# Patient Record
Sex: MALE | Race: WHITE | NOT HISPANIC OR LATINO | URBAN - METROPOLITAN AREA
[De-identification: names, ages, dates, MRNs, and addresses within clinical notes are randomized per-mention and may not be internally consistent; named-entity substitution may affect disease eponyms.]

---

## 2019-10-01 ENCOUNTER — SURGERY - HEALTHEAST (OUTPATIENT)
Dept: SURGERY | Facility: HOSPITAL | Age: 54
End: 2019-10-01

## 2019-10-01 ENCOUNTER — ANESTHESIA - HEALTHEAST (OUTPATIENT)
Dept: SURGERY | Facility: HOSPITAL | Age: 54
End: 2019-10-01

## 2019-10-01 ENCOUNTER — HOSPITAL ENCOUNTER (INPATIENT)
Dept: MEDSURG UNIT | Facility: HOSPITAL | Age: 54
Discharge: HOME | End: 2019-10-11
Attending: EMERGENCY MEDICINE | Admitting: FAMILY MEDICINE

## 2019-10-01 DIAGNOSIS — R06.6 HICCUPS: ICD-10-CM

## 2019-10-01 DIAGNOSIS — K56.699 OTHER SPECIFIED INTESTINAL OBSTRUCTION, UNSPECIFIED WHETHER PARTIAL OR COMPLETE (H): ICD-10-CM

## 2019-10-01 DIAGNOSIS — K65.1 ABSCESS OF MALE PELVIS (H): ICD-10-CM

## 2019-10-01 DIAGNOSIS — K57.92 DIVERTICULITIS: ICD-10-CM

## 2019-10-01 DIAGNOSIS — E83.42 HYPOMAGNESEMIA: ICD-10-CM

## 2019-10-01 DIAGNOSIS — K57.20 DIVERTICULITIS OF LARGE INTESTINE WITH PERFORATION AND ABSCESS WITHOUT BLEEDING: ICD-10-CM

## 2019-10-01 LAB
ALBUMIN SERPL-MCNC: 3.2 G/DL (ref 3.5–5)
ALBUMIN UR-MCNC: ABNORMAL MG/DL
ALP SERPL-CCNC: 71 U/L (ref 45–120)
ALT SERPL W P-5'-P-CCNC: 16 U/L (ref 0–45)
ANION GAP SERPL CALCULATED.3IONS-SCNC: 8 MMOL/L (ref 5–18)
APPEARANCE UR: CLEAR
AST SERPL W P-5'-P-CCNC: 14 U/L (ref 0–40)
BACTERIA #/AREA URNS HPF: ABNORMAL HPF
BILIRUB DIRECT SERPL-MCNC: 0.3 MG/DL
BILIRUB SERPL-MCNC: 0.5 MG/DL (ref 0–1)
BILIRUB UR QL STRIP: ABNORMAL
BUN SERPL-MCNC: 18 MG/DL (ref 8–22)
CALCIUM SERPL-MCNC: 9.1 MG/DL (ref 8.5–10.5)
CHLORIDE BLD-SCNC: 101 MMOL/L (ref 98–107)
CO2 SERPL-SCNC: 27 MMOL/L (ref 22–31)
COLOR UR AUTO: ABNORMAL
CREAT SERPL-MCNC: 0.87 MG/DL (ref 0.7–1.3)
ERYTHROCYTE [DISTWIDTH] IN BLOOD BY AUTOMATED COUNT: 14.5 % (ref 11–14.5)
GFR SERPL CREATININE-BSD FRML MDRD: >60 ML/MIN/1.73M2
GLUCOSE BLD-MCNC: 118 MG/DL (ref 70–125)
GLUCOSE UR STRIP-MCNC: NEGATIVE MG/DL
HCT VFR BLD AUTO: 40.8 % (ref 40–54)
HGB BLD-MCNC: 13.1 G/DL (ref 14–18)
HGB UR QL STRIP: ABNORMAL
KETONES UR STRIP-MCNC: ABNORMAL MG/DL
LEUKOCYTE ESTERASE UR QL STRIP: NEGATIVE
LIPASE SERPL-CCNC: 13 U/L (ref 0–52)
MCH RBC QN AUTO: 28.5 PG (ref 27–34)
MCHC RBC AUTO-ENTMCNC: 32.1 G/DL (ref 32–36)
MCV RBC AUTO: 89 FL (ref 80–100)
MUCOUS THREADS #/AREA URNS LPF: ABNORMAL LPF
NITRATE UR QL: NEGATIVE
PH UR STRIP: 6 [PH] (ref 4.5–8)
PLATELET # BLD AUTO: 532 THOU/UL (ref 140–440)
PMV BLD AUTO: 8.1 FL (ref 8.5–12.5)
POTASSIUM BLD-SCNC: 4.2 MMOL/L (ref 3.5–5)
PROT SERPL-MCNC: 7.2 G/DL (ref 6–8)
RBC # BLD AUTO: 4.6 MILL/UL (ref 4.4–6.2)
RBC #/AREA URNS AUTO: ABNORMAL HPF
SODIUM SERPL-SCNC: 136 MMOL/L (ref 136–145)
SP GR UR STRIP: 1.03 (ref 1–1.03)
SQUAMOUS #/AREA URNS AUTO: ABNORMAL LPF
UROBILINOGEN UR STRIP-ACNC: ABNORMAL
WBC #/AREA URNS AUTO: ABNORMAL HPF
WBC: 16.2 THOU/UL (ref 4–11)

## 2019-10-01 ASSESSMENT — MIFFLIN-ST. JEOR
SCORE: 1818.74
SCORE: 1818.74

## 2019-10-02 LAB
ANION GAP SERPL CALCULATED.3IONS-SCNC: 7 MMOL/L (ref 5–18)
ATRIAL RATE - MUSE: 97 BPM
BASOPHILS # BLD AUTO: 0 THOU/UL (ref 0–0.2)
BASOPHILS NFR BLD AUTO: 0 % (ref 0–2)
BUN SERPL-MCNC: 21 MG/DL (ref 8–22)
CALCIUM SERPL-MCNC: 8.5 MG/DL (ref 8.5–10.5)
CEA SERPL-MCNC: 9.1 NG/ML (ref 0–3)
CHLORIDE BLD-SCNC: 103 MMOL/L (ref 98–107)
CO2 SERPL-SCNC: 26 MMOL/L (ref 22–31)
CREAT SERPL-MCNC: 0.88 MG/DL (ref 0.7–1.3)
DIASTOLIC BLOOD PRESSURE - MUSE: NORMAL
EOSINOPHIL # BLD AUTO: 0 THOU/UL (ref 0–0.4)
EOSINOPHIL NFR BLD AUTO: 0 % (ref 0–6)
ERYTHROCYTE [DISTWIDTH] IN BLOOD BY AUTOMATED COUNT: 14.5 % (ref 11–14.5)
GFR SERPL CREATININE-BSD FRML MDRD: >60 ML/MIN/1.73M2
GLUCOSE BLD-MCNC: 149 MG/DL (ref 70–125)
HCT VFR BLD AUTO: 42.6 % (ref 40–54)
HGB BLD-MCNC: 13.7 G/DL (ref 14–18)
INTERPRETATION ECG - MUSE: NORMAL
LYMPHOCYTES # BLD AUTO: 0.8 THOU/UL (ref 0.8–4.4)
LYMPHOCYTES NFR BLD AUTO: 4 % (ref 20–40)
MCH RBC QN AUTO: 28.4 PG (ref 27–34)
MCHC RBC AUTO-ENTMCNC: 32.2 G/DL (ref 32–36)
MCV RBC AUTO: 88 FL (ref 80–100)
MONOCYTES # BLD AUTO: 0.8 THOU/UL (ref 0–0.9)
MONOCYTES NFR BLD AUTO: 4 % (ref 2–10)
NEUTROPHILS # BLD AUTO: 16.2 THOU/UL (ref 2–7.7)
NEUTROPHILS NFR BLD AUTO: 91 % (ref 50–70)
P AXIS - MUSE: 62 DEGREES
PLATELET # BLD AUTO: 579 THOU/UL (ref 140–440)
PMV BLD AUTO: 8 FL (ref 8.5–12.5)
POTASSIUM BLD-SCNC: 4.6 MMOL/L (ref 3.5–5)
PR INTERVAL - MUSE: 136 MS
QRS DURATION - MUSE: 82 MS
QT - MUSE: 376 MS
QTC - MUSE: 477 MS
R AXIS - MUSE: 71 DEGREES
RBC # BLD AUTO: 4.82 MILL/UL (ref 4.4–6.2)
SODIUM SERPL-SCNC: 136 MMOL/L (ref 136–145)
SYSTOLIC BLOOD PRESSURE - MUSE: NORMAL
T AXIS - MUSE: 61 DEGREES
VENTRICULAR RATE- MUSE: 97 BPM
WBC: 17.8 THOU/UL (ref 4–11)

## 2019-10-02 ASSESSMENT — MIFFLIN-ST. JEOR
SCORE: 1829.17
SCORE: 1829.17

## 2019-10-03 LAB
LAB AP CHARGES (HE HISTORICAL CONVERSION): NORMAL
PATH REPORT.COMMENTS IMP SPEC: NORMAL
PATH REPORT.FINAL DX SPEC: NORMAL
PATH REPORT.GROSS SPEC: NORMAL
PATH REPORT.MICROSCOPIC SPEC OTHER STN: NORMAL
PATH REPORT.RELEVANT HX SPEC: NORMAL
RESULT FLAG (HE HISTORICAL CONVERSION): NORMAL

## 2019-10-03 ASSESSMENT — MIFFLIN-ST. JEOR
SCORE: 1834.62
SCORE: 1834.62

## 2019-10-04 LAB
ANION GAP SERPL CALCULATED.3IONS-SCNC: 9 MMOL/L (ref 5–18)
BUN SERPL-MCNC: 27 MG/DL (ref 8–22)
CALCIUM SERPL-MCNC: 7.9 MG/DL (ref 8.5–10.5)
CHLORIDE BLD-SCNC: 102 MMOL/L (ref 98–107)
CO2 SERPL-SCNC: 28 MMOL/L (ref 22–31)
CREAT SERPL-MCNC: 0.94 MG/DL (ref 0.7–1.3)
ERYTHROCYTE [DISTWIDTH] IN BLOOD BY AUTOMATED COUNT: 14.6 % (ref 11–14.5)
GFR SERPL CREATININE-BSD FRML MDRD: >60 ML/MIN/1.73M2
GLUCOSE BLD-MCNC: 132 MG/DL (ref 70–125)
HCT VFR BLD AUTO: 36.6 % (ref 40–54)
HGB BLD-MCNC: 11.8 G/DL (ref 14–18)
MCH RBC QN AUTO: 28.4 PG (ref 27–34)
MCHC RBC AUTO-ENTMCNC: 32.2 G/DL (ref 32–36)
MCV RBC AUTO: 88 FL (ref 80–100)
PLATELET # BLD AUTO: 559 THOU/UL (ref 140–440)
PLATELET # BLD AUTO: 559 THOU/UL (ref 140–440)
PMV BLD AUTO: 8.2 FL (ref 8.5–12.5)
POTASSIUM BLD-SCNC: 4.8 MMOL/L (ref 3.5–5)
RBC # BLD AUTO: 4.16 MILL/UL (ref 4.4–6.2)
SODIUM SERPL-SCNC: 139 MMOL/L (ref 136–145)
WBC: 6 THOU/UL (ref 4–11)

## 2019-10-04 ASSESSMENT — MIFFLIN-ST. JEOR
SCORE: 1820.56
SCORE: 1820.56

## 2019-10-05 LAB
ANION GAP SERPL CALCULATED.3IONS-SCNC: 6 MMOL/L (ref 5–18)
BUN SERPL-MCNC: 26 MG/DL (ref 8–22)
CALCIUM SERPL-MCNC: 7.9 MG/DL (ref 8.5–10.5)
CHLORIDE BLD-SCNC: 106 MMOL/L (ref 98–107)
CO2 SERPL-SCNC: 29 MMOL/L (ref 22–31)
CREAT SERPL-MCNC: 0.93 MG/DL (ref 0.7–1.3)
ERYTHROCYTE [DISTWIDTH] IN BLOOD BY AUTOMATED COUNT: 14.9 % (ref 11–14.5)
GFR SERPL CREATININE-BSD FRML MDRD: >60 ML/MIN/1.73M2
GLUCOSE BLD-MCNC: 120 MG/DL (ref 70–125)
HCT VFR BLD AUTO: 34.2 % (ref 40–54)
HGB BLD-MCNC: 10.8 G/DL (ref 14–18)
MCH RBC QN AUTO: 28.1 PG (ref 27–34)
MCHC RBC AUTO-ENTMCNC: 31.6 G/DL (ref 32–36)
MCV RBC AUTO: 89 FL (ref 80–100)
PLATELET # BLD AUTO: 514 THOU/UL (ref 140–440)
PMV BLD AUTO: 8 FL (ref 8.5–12.5)
POTASSIUM BLD-SCNC: 4.5 MMOL/L (ref 3.5–5)
RBC # BLD AUTO: 3.85 MILL/UL (ref 4.4–6.2)
SODIUM SERPL-SCNC: 141 MMOL/L (ref 136–145)
WBC: 7.9 THOU/UL (ref 4–11)

## 2019-10-05 ASSESSMENT — MIFFLIN-ST. JEOR
SCORE: 1820.56
SCORE: 1820.56

## 2019-10-06 LAB
ANION GAP SERPL CALCULATED.3IONS-SCNC: 10 MMOL/L (ref 5–18)
BACTERIA SPEC CULT: ABNORMAL
BUN SERPL-MCNC: 24 MG/DL (ref 8–22)
CALCIUM SERPL-MCNC: 8.4 MG/DL (ref 8.5–10.5)
CHLORIDE BLD-SCNC: 104 MMOL/L (ref 98–107)
CO2 SERPL-SCNC: 25 MMOL/L (ref 22–31)
CREAT SERPL-MCNC: 0.78 MG/DL (ref 0.7–1.3)
GFR SERPL CREATININE-BSD FRML MDRD: >60 ML/MIN/1.73M2
GLUCOSE BLD-MCNC: 114 MG/DL (ref 70–125)
GRAM STAIN RESULT: ABNORMAL
PLATELET # BLD AUTO: 582 THOU/UL (ref 140–440)
POTASSIUM BLD-SCNC: 4.4 MMOL/L (ref 3.5–5)
SODIUM SERPL-SCNC: 139 MMOL/L (ref 136–145)

## 2019-10-06 ASSESSMENT — MIFFLIN-ST. JEOR
SCORE: 1809.22
SCORE: 1809.22

## 2019-10-07 LAB
ANION GAP SERPL CALCULATED.3IONS-SCNC: 10 MMOL/L (ref 5–18)
BASOPHILS # BLD AUTO: 0 THOU/UL (ref 0–0.2)
BASOPHILS NFR BLD AUTO: 0 % (ref 0–2)
BUN SERPL-MCNC: 26 MG/DL (ref 8–22)
C DIFF TOX B STL QL: NEGATIVE
CALCIUM SERPL-MCNC: 8.3 MG/DL (ref 8.5–10.5)
CHLORIDE BLD-SCNC: 104 MMOL/L (ref 98–107)
CO2 SERPL-SCNC: 24 MMOL/L (ref 22–31)
CREAT SERPL-MCNC: 0.81 MG/DL (ref 0.7–1.3)
EOSINOPHIL # BLD AUTO: 0.1 THOU/UL (ref 0–0.4)
EOSINOPHIL NFR BLD AUTO: 1 % (ref 0–6)
ERYTHROCYTE [DISTWIDTH] IN BLOOD BY AUTOMATED COUNT: 14.6 % (ref 11–14.5)
ERYTHROCYTE [DISTWIDTH] IN BLOOD BY AUTOMATED COUNT: 14.7 % (ref 11–14.5)
GFR SERPL CREATININE-BSD FRML MDRD: >60 ML/MIN/1.73M2
GLUCOSE BLD-MCNC: 112 MG/DL (ref 70–125)
HCT VFR BLD AUTO: 37.2 % (ref 40–54)
HCT VFR BLD AUTO: 37.9 % (ref 40–54)
HGB BLD-MCNC: 11.8 G/DL (ref 14–18)
HGB BLD-MCNC: 12.1 G/DL (ref 14–18)
LACTATE SERPL-SCNC: 1.1 MMOL/L (ref 0.5–2.2)
LYMPHOCYTES # BLD AUTO: 0.8 THOU/UL (ref 0.8–4.4)
LYMPHOCYTES NFR BLD AUTO: 5 % (ref 20–40)
MCH RBC QN AUTO: 27.8 PG (ref 27–34)
MCH RBC QN AUTO: 28 PG (ref 27–34)
MCHC RBC AUTO-ENTMCNC: 31.7 G/DL (ref 32–36)
MCHC RBC AUTO-ENTMCNC: 31.9 G/DL (ref 32–36)
MCV RBC AUTO: 88 FL (ref 80–100)
MCV RBC AUTO: 88 FL (ref 80–100)
MONOCYTES # BLD AUTO: 0.6 THOU/UL (ref 0–0.9)
MONOCYTES NFR BLD AUTO: 4 % (ref 2–10)
NEUTROPHILS # BLD AUTO: 13.8 THOU/UL (ref 2–7.7)
NEUTROPHILS NFR BLD AUTO: 89 % (ref 50–70)
PLATELET # BLD AUTO: 594 THOU/UL (ref 140–440)
PLATELET # BLD AUTO: 616 THOU/UL (ref 140–440)
PMV BLD AUTO: 8 FL (ref 8.5–12.5)
PMV BLD AUTO: 8.3 FL (ref 8.5–12.5)
POTASSIUM BLD-SCNC: 4.2 MMOL/L (ref 3.5–5)
RBC # BLD AUTO: 4.25 MILL/UL (ref 4.4–6.2)
RBC # BLD AUTO: 4.32 MILL/UL (ref 4.4–6.2)
RIBOTYPE 027/NAP1/BI: NORMAL
SODIUM SERPL-SCNC: 138 MMOL/L (ref 136–145)
WBC: 11.7 THOU/UL (ref 4–11)
WBC: 15.5 THOU/UL (ref 4–11)

## 2019-10-08 LAB
ALBUMIN SERPL-MCNC: 2.4 G/DL (ref 3.5–5)
ALP SERPL-CCNC: 51 U/L (ref 45–120)
ALT SERPL W P-5'-P-CCNC: 13 U/L (ref 0–45)
ANION GAP SERPL CALCULATED.3IONS-SCNC: 10 MMOL/L (ref 5–18)
AST SERPL W P-5'-P-CCNC: 12 U/L (ref 0–40)
BILIRUB SERPL-MCNC: 0.6 MG/DL (ref 0–1)
BUN SERPL-MCNC: 21 MG/DL (ref 8–22)
CALCIUM SERPL-MCNC: 7.8 MG/DL (ref 8.5–10.5)
CHLORIDE BLD-SCNC: 104 MMOL/L (ref 98–107)
CO2 SERPL-SCNC: 24 MMOL/L (ref 22–31)
CREAT SERPL-MCNC: 0.71 MG/DL (ref 0.7–1.3)
ERYTHROCYTE [DISTWIDTH] IN BLOOD BY AUTOMATED COUNT: 14.8 % (ref 11–14.5)
GFR SERPL CREATININE-BSD FRML MDRD: >60 ML/MIN/1.73M2
GLUCOSE BLD-MCNC: 112 MG/DL (ref 70–125)
HCT VFR BLD AUTO: 34.6 % (ref 40–54)
HGB BLD-MCNC: 11 G/DL (ref 14–18)
INR PPP: 1.36 (ref 0.9–1.1)
MAGNESIUM SERPL-MCNC: 1.9 MG/DL (ref 1.8–2.6)
MCH RBC QN AUTO: 28.1 PG (ref 27–34)
MCHC RBC AUTO-ENTMCNC: 31.8 G/DL (ref 32–36)
MCV RBC AUTO: 89 FL (ref 80–100)
PLATELET # BLD AUTO: 577 THOU/UL (ref 140–440)
PMV BLD AUTO: 8.3 FL (ref 8.5–12.5)
POTASSIUM BLD-SCNC: 3.9 MMOL/L (ref 3.5–5)
PROT SERPL-MCNC: 5.8 G/DL (ref 6–8)
RBC # BLD AUTO: 3.91 MILL/UL (ref 4.4–6.2)
SODIUM SERPL-SCNC: 138 MMOL/L (ref 136–145)
WBC: 12.8 THOU/UL (ref 4–11)

## 2019-10-09 LAB
ANION GAP SERPL CALCULATED.3IONS-SCNC: 8 MMOL/L (ref 5–18)
BUN SERPL-MCNC: 15 MG/DL (ref 8–22)
CALCIUM SERPL-MCNC: 7.5 MG/DL (ref 8.5–10.5)
CHLORIDE BLD-SCNC: 106 MMOL/L (ref 98–107)
CO2 SERPL-SCNC: 25 MMOL/L (ref 22–31)
CREAT SERPL-MCNC: 0.62 MG/DL (ref 0.7–1.3)
ERYTHROCYTE [DISTWIDTH] IN BLOOD BY AUTOMATED COUNT: 15 % (ref 11–14.5)
GFR SERPL CREATININE-BSD FRML MDRD: >60 ML/MIN/1.73M2
GLUCOSE BLD-MCNC: 126 MG/DL (ref 70–125)
HCT VFR BLD AUTO: 31.1 % (ref 40–54)
HGB BLD-MCNC: 9.9 G/DL (ref 14–18)
LACTATE SERPL-SCNC: 0.8 MMOL/L (ref 0.5–2.2)
MAGNESIUM SERPL-MCNC: 2 MG/DL (ref 1.8–2.6)
MCH RBC QN AUTO: 28 PG (ref 27–34)
MCHC RBC AUTO-ENTMCNC: 31.8 G/DL (ref 32–36)
MCV RBC AUTO: 88 FL (ref 80–100)
PHOSPHATE SERPL-MCNC: 1.6 MG/DL (ref 2.5–4.5)
PLATELET # BLD AUTO: 501 THOU/UL (ref 140–440)
PMV BLD AUTO: 8.2 FL (ref 8.5–12.5)
POTASSIUM BLD-SCNC: 3.6 MMOL/L (ref 3.5–5)
POTASSIUM BLD-SCNC: 3.9 MMOL/L (ref 3.5–5)
RBC # BLD AUTO: 3.54 MILL/UL (ref 4.4–6.2)
SODIUM SERPL-SCNC: 139 MMOL/L (ref 136–145)
WBC: 13.8 THOU/UL (ref 4–11)

## 2019-10-09 ASSESSMENT — MIFFLIN-ST. JEOR
SCORE: 1811.94
SCORE: 1811.94

## 2019-10-10 LAB
ERYTHROCYTE [DISTWIDTH] IN BLOOD BY AUTOMATED COUNT: 15.2 % (ref 11–14.5)
HCT VFR BLD AUTO: 34.2 % (ref 40–54)
HGB BLD-MCNC: 10.7 G/DL (ref 14–18)
MAGNESIUM SERPL-MCNC: 1.7 MG/DL (ref 1.8–2.6)
MCH RBC QN AUTO: 27.6 PG (ref 27–34)
MCHC RBC AUTO-ENTMCNC: 31.3 G/DL (ref 32–36)
MCV RBC AUTO: 88 FL (ref 80–100)
PHOSPHATE SERPL-MCNC: 2 MG/DL (ref 2.5–4.5)
PLATELET # BLD AUTO: 568 THOU/UL (ref 140–440)
PMV BLD AUTO: 8.5 FL (ref 8.5–12.5)
POTASSIUM BLD-SCNC: 3.4 MMOL/L (ref 3.5–5)
POTASSIUM BLD-SCNC: 3.5 MMOL/L (ref 3.5–5)
RBC # BLD AUTO: 3.87 MILL/UL (ref 4.4–6.2)
WBC: 14.9 THOU/UL (ref 4–11)

## 2019-10-11 LAB
ALBUMIN SERPL-MCNC: 2.1 G/DL (ref 3.5–5)
ALP SERPL-CCNC: 51 U/L (ref 45–120)
ALT SERPL W P-5'-P-CCNC: 12 U/L (ref 0–45)
ANION GAP SERPL CALCULATED.3IONS-SCNC: 8 MMOL/L (ref 5–18)
AST SERPL W P-5'-P-CCNC: 15 U/L (ref 0–40)
ATRIAL RATE - MUSE: 97 BPM
BILIRUB SERPL-MCNC: 0.5 MG/DL (ref 0–1)
BUN SERPL-MCNC: 6 MG/DL (ref 8–22)
CALCIUM SERPL-MCNC: 7.6 MG/DL (ref 8.5–10.5)
CHLORIDE BLD-SCNC: 101 MMOL/L (ref 98–107)
CO2 SERPL-SCNC: 27 MMOL/L (ref 22–31)
CREAT SERPL-MCNC: 0.7 MG/DL (ref 0.7–1.3)
DIASTOLIC BLOOD PRESSURE - MUSE: NORMAL
ERYTHROCYTE [DISTWIDTH] IN BLOOD BY AUTOMATED COUNT: 15.3 % (ref 11–14.5)
GFR SERPL CREATININE-BSD FRML MDRD: >60 ML/MIN/1.73M2
GLUCOSE BLD-MCNC: 106 MG/DL (ref 70–125)
HCT VFR BLD AUTO: 30.6 % (ref 40–54)
HGB BLD-MCNC: 9.7 G/DL (ref 14–18)
INTERPRETATION ECG - MUSE: NORMAL
MCH RBC QN AUTO: 27.8 PG (ref 27–34)
MCHC RBC AUTO-ENTMCNC: 31.7 G/DL (ref 32–36)
MCV RBC AUTO: 88 FL (ref 80–100)
P AXIS - MUSE: 29 DEGREES
PHOSPHATE SERPL-MCNC: 2.8 MG/DL (ref 2.5–4.5)
PLATELET # BLD AUTO: 554 THOU/UL (ref 140–440)
PMV BLD AUTO: 8.5 FL (ref 8.5–12.5)
POTASSIUM BLD-SCNC: 3.7 MMOL/L (ref 3.5–5)
PR INTERVAL - MUSE: 136 MS
PROT SERPL-MCNC: 5.3 G/DL (ref 6–8)
QRS DURATION - MUSE: 90 MS
QT - MUSE: 370 MS
QTC - MUSE: 469 MS
R AXIS - MUSE: 62 DEGREES
RBC # BLD AUTO: 3.49 MILL/UL (ref 4.4–6.2)
SODIUM SERPL-SCNC: 136 MMOL/L (ref 136–145)
SYSTOLIC BLOOD PRESSURE - MUSE: NORMAL
T AXIS - MUSE: 59 DEGREES
VENTRICULAR RATE- MUSE: 97 BPM
WBC: 14.7 THOU/UL (ref 4–11)

## 2019-10-11 RX ORDER — CALCIUM CARBONATE 500 MG/1
200 TABLET, CHEWABLE ORAL 4 TIMES DAILY PRN
Refills: 0 | Status: SHIPPED | COMMUNITY
Start: 2019-10-11

## 2019-10-12 LAB
AEROBIC BLOOD CULTURE BOTTLE: NO GROWTH
AEROBIC BLOOD CULTURE BOTTLE: NO GROWTH
ANAEROBIC BLOOD CULTURE BOTTLE: NO GROWTH
ANAEROBIC BLOOD CULTURE BOTTLE: NO GROWTH

## 2019-10-13 LAB
BACTERIA SPEC CULT: ABNORMAL
GRAM STAIN RESULT: ABNORMAL

## 2019-10-14 ENCOUNTER — COMMUNICATION - HEALTHEAST (OUTPATIENT)
Dept: SURGERY | Facility: CLINIC | Age: 54
End: 2019-10-14

## 2019-10-14 ENCOUNTER — RECORDS - HEALTHEAST (OUTPATIENT)
Dept: LAB | Facility: CLINIC | Age: 54
End: 2019-10-14

## 2019-10-15 LAB
ANION GAP SERPL CALCULATED.3IONS-SCNC: 6 MMOL/L (ref 5–18)
BUN SERPL-MCNC: 10 MG/DL (ref 8–22)
CALCIUM SERPL-MCNC: 8.6 MG/DL (ref 8.5–10.5)
CHLORIDE BLD-SCNC: 103 MMOL/L (ref 98–107)
CHOLEST SERPL-MCNC: 99 MG/DL
CO2 SERPL-SCNC: 26 MMOL/L (ref 22–31)
CREAT SERPL-MCNC: 0.78 MG/DL (ref 0.7–1.3)
FASTING STATUS PATIENT QL REPORTED: ABNORMAL
GFR SERPL CREATININE-BSD FRML MDRD: >60 ML/MIN/1.73M2
GLUCOSE BLD-MCNC: 101 MG/DL (ref 70–125)
HDLC SERPL-MCNC: 19 MG/DL
LDLC SERPL CALC-MCNC: 61 MG/DL
MAGNESIUM SERPL-MCNC: 2 MG/DL (ref 1.8–2.6)
POTASSIUM BLD-SCNC: 5.4 MMOL/L (ref 3.5–5)
SODIUM SERPL-SCNC: 135 MMOL/L (ref 136–145)
TRIGL SERPL-MCNC: 95 MG/DL

## 2019-10-17 ENCOUNTER — AMBULATORY - HEALTHEAST (OUTPATIENT)
Dept: SCHEDULING | Facility: CLINIC | Age: 54
End: 2019-10-17

## 2019-10-17 DIAGNOSIS — Z71.89 OSTOMY NURSE CONSULTATION: ICD-10-CM

## 2019-10-22 ENCOUNTER — OFFICE VISIT - HEALTHEAST (OUTPATIENT)
Dept: SURGERY | Facility: CLINIC | Age: 54
End: 2019-10-22

## 2019-10-22 ENCOUNTER — OFFICE VISIT - HEALTHEAST (OUTPATIENT)
Dept: WOUND CARE | Facility: HOSPITAL | Age: 54
End: 2019-10-22

## 2019-10-22 DIAGNOSIS — K65.1 PELVIC ABSCESS IN MALE (H): ICD-10-CM

## 2019-10-22 DIAGNOSIS — Z71.89 OSTOMY NURSE CONSULTATION: ICD-10-CM

## 2019-10-22 DIAGNOSIS — Z48.89 POSTOPERATIVE VISIT: ICD-10-CM

## 2019-10-22 LAB
ERYTHROCYTE [DISTWIDTH] IN BLOOD BY AUTOMATED COUNT: 13.4 % (ref 11–14.5)
HCT VFR BLD AUTO: 38.4 % (ref 40–54)
HGB BLD-MCNC: 12.6 G/DL (ref 14–18)
MCH RBC QN AUTO: 28.3 PG (ref 27–34)
MCHC RBC AUTO-ENTMCNC: 32.9 G/DL (ref 32–36)
MCV RBC AUTO: 86 FL (ref 80–100)
PLATELET # BLD AUTO: 581 THOU/UL (ref 140–440)
PMV BLD AUTO: 6.1 FL (ref 7–10)
RBC # BLD AUTO: 4.46 MILL/UL (ref 4.4–6.2)
WBC: 7.5 THOU/UL (ref 4–11)

## 2019-10-22 ASSESSMENT — MIFFLIN-ST. JEOR: SCORE: 1826.45

## 2019-10-24 ENCOUNTER — AMBULATORY - HEALTHEAST (OUTPATIENT)
Dept: SURGERY | Facility: CLINIC | Age: 54
End: 2019-10-24

## 2019-10-24 DIAGNOSIS — Z93.9 HISTORY OF CREATION OF OSTOMY (H): ICD-10-CM

## 2019-11-06 ENCOUNTER — COMMUNICATION - HEALTHEAST (OUTPATIENT)
Dept: SURGERY | Facility: CLINIC | Age: 54
End: 2019-11-06

## 2019-12-02 ENCOUNTER — RECORDS - HEALTHEAST (OUTPATIENT)
Dept: LAB | Facility: CLINIC | Age: 54
End: 2019-12-02

## 2019-12-02 LAB
ANION GAP SERPL CALCULATED.3IONS-SCNC: 9 MMOL/L (ref 5–18)
BUN SERPL-MCNC: 15 MG/DL (ref 8–22)
CALCIUM SERPL-MCNC: 9.1 MG/DL (ref 8.5–10.5)
CHLORIDE BLD-SCNC: 105 MMOL/L (ref 98–107)
CO2 SERPL-SCNC: 25 MMOL/L (ref 22–31)
CREAT SERPL-MCNC: 0.89 MG/DL (ref 0.7–1.3)
GFR SERPL CREATININE-BSD FRML MDRD: >60 ML/MIN/1.73M2
GLUCOSE BLD-MCNC: 102 MG/DL (ref 70–125)
POTASSIUM BLD-SCNC: 4.4 MMOL/L (ref 3.5–5)
SODIUM SERPL-SCNC: 139 MMOL/L (ref 136–145)

## 2020-01-07 ENCOUNTER — OFFICE VISIT - HEALTHEAST (OUTPATIENT)
Dept: SURGERY | Facility: CLINIC | Age: 55
End: 2020-01-07

## 2020-01-07 DIAGNOSIS — Z93.9 HISTORY OF CREATION OF OSTOMY (H): ICD-10-CM

## 2020-01-07 ASSESSMENT — MIFFLIN-ST. JEOR: SCORE: 1820.56

## 2020-01-13 ENCOUNTER — SURGERY - HEALTHEAST (OUTPATIENT)
Dept: SURGERY | Facility: CLINIC | Age: 55
End: 2020-01-13

## 2020-01-13 DIAGNOSIS — Z12.11 ENCOUNTER FOR SCREENING COLONOSCOPY: ICD-10-CM

## 2020-01-20 ENCOUNTER — RECORDS - HEALTHEAST (OUTPATIENT)
Dept: LAB | Facility: CLINIC | Age: 55
End: 2020-01-20

## 2020-01-20 LAB
ANION GAP SERPL CALCULATED.3IONS-SCNC: 13 MMOL/L (ref 5–18)
BUN SERPL-MCNC: 16 MG/DL (ref 8–22)
CALCIUM SERPL-MCNC: 8.8 MG/DL (ref 8.5–10.5)
CHLORIDE BLD-SCNC: 106 MMOL/L (ref 98–107)
CO2 SERPL-SCNC: 22 MMOL/L (ref 22–31)
CREAT SERPL-MCNC: 0.85 MG/DL (ref 0.7–1.3)
GFR SERPL CREATININE-BSD FRML MDRD: >60 ML/MIN/1.73M2
GLUCOSE BLD-MCNC: 70 MG/DL (ref 70–125)
POTASSIUM BLD-SCNC: 4.4 MMOL/L (ref 3.5–5)
SODIUM SERPL-SCNC: 141 MMOL/L (ref 136–145)

## 2020-01-22 ASSESSMENT — MIFFLIN-ST. JEOR: SCORE: 1820.56

## 2020-01-23 ENCOUNTER — ANESTHESIA - HEALTHEAST (OUTPATIENT)
Dept: SURGERY | Facility: AMBULATORY SURGERY CENTER | Age: 55
End: 2020-01-23

## 2020-01-24 ENCOUNTER — ANESTHESIA - HEALTHEAST (OUTPATIENT)
Dept: SURGERY | Facility: HOSPITAL | Age: 55
End: 2020-01-24

## 2020-01-24 ENCOUNTER — SURGERY - HEALTHEAST (OUTPATIENT)
Dept: SURGERY | Facility: AMBULATORY SURGERY CENTER | Age: 55
End: 2020-01-24

## 2020-01-27 ENCOUNTER — SURGERY - HEALTHEAST (OUTPATIENT)
Dept: SURGERY | Facility: HOSPITAL | Age: 55
End: 2020-01-27

## 2020-01-30 ASSESSMENT — MIFFLIN-ST. JEOR
SCORE: 1835.07
SCORE: 1824.64

## 2020-02-03 ENCOUNTER — COMMUNICATION - HEALTHEAST (OUTPATIENT)
Dept: SURGERY | Facility: CLINIC | Age: 55
End: 2020-02-03

## 2020-02-11 ENCOUNTER — OFFICE VISIT - HEALTHEAST (OUTPATIENT)
Dept: SURGERY | Facility: CLINIC | Age: 55
End: 2020-02-11

## 2020-02-11 DIAGNOSIS — Z48.89 POSTOPERATIVE VISIT: ICD-10-CM

## 2020-02-11 RX ORDER — PRIMIDONE 50 MG/1
TABLET ORAL
Status: SHIPPED | COMMUNITY
Start: 2020-02-10

## 2020-03-10 ENCOUNTER — COMMUNICATION - HEALTHEAST (OUTPATIENT)
Dept: SURGERY | Facility: CLINIC | Age: 55
End: 2020-03-10

## 2021-05-27 ENCOUNTER — RECORDS - HEALTHEAST (OUTPATIENT)
Dept: ADMINISTRATIVE | Facility: CLINIC | Age: 56
End: 2021-05-27

## 2021-06-01 NOTE — CONSULTS
Consults     Consultation - Surgery  Kwaku Le,  1965, MRN [id number]    Admitting Dx: No admission diagnoses are documented for this encounter.    PCP: Provider, No Primary Care, None   Code status:  No Order       Extended Emergency Contact Information  Primary Emergency Contact: KHALIF MONTE  Mobile Phone: [phone]  Relation: Significant Other  Secondary Emergency Contact: FUENTES LE  Mobile Phone: [phone]  Relation: Sibling       Assessment and Plan   Impression:  Colonic obstruction likely secondary to the colon cancer with secondary acute perforated diverticulitis.  Patient I think needs surgical intervention tonight.  I discussed this with him.  Because his proximal colon is so dilated, and there is likely going to be significant inflammation, this will require a sigmoid colectomy with diverting colostomy.  He understands.  He asked appropriate questions.      Plan:  To the OR for exploratory laparotomy, sigmoid colectomy with diverting colostomy.  Check a CEA.           Chief Complaint Diverticulitis       HPI    We have been requested by Dr. Staples to evaluate Kwaku Le for abdominal pain.  This is a 53 y.o. year old male progressive abdominal pain over the weekend.  Patient states he has had increasing constipation over the last month but then over the weekend began feeling quite a bit more bloated and complaining of pain in the left lower quadrant.  He denies any fevers.  He denies any chills.  He denies any blood in his stool.  He is never had a colonoscopy.  He has no family history of colon cancer.       Medical History  There are no active non-hospital problems to display for this patient.    No past medical history on file. Surgical History  He  has no past surgical history on file.   Social History  1 to 2 pack a day smoker, occasional alcohol on weekends.   Allergies  No Known Allergies Family History  Reviewed, and family history is not on file.  The Family history  is not pertinent to the patients chief complaint.  No family history of colon cancer Psychosocial Needs  Social History     Patient does not qualify to have social determinant information on file (likely too young).   Social History Narrative     Not on file     Additional psychosocial needs reviewed per nursing assessment.     Prior to Admission Medications   Medications Prior to Admission   Medication Sig Dispense Refill Last Dose     ibuprofen (ADVIL,MOTRIN) 200 MG tablet Take 600 mg by mouth every 6 (six) hours as needed for pain.   9/27/2019          Review of Systems:  A 12 point comprehensive review of systems was negative except as noted. Physical Exam:  Temp:  [98.4  F (36.9  C)] 98.4  F (36.9  C)  Heart Rate:  [] 95  Resp:  [20] 20  BP: (120-137)/(70-75) 128/70    General appearance: alert, appears stated age and cooperative  Eyes: negative   Lungs: Breathing comfortably.  No shortness of breath.  Abdomen: Markedly distended and tympanitic.  Fairly minimal tenderness in the left lower quadrant.  No guarding or rebound.  No palpable mass.  Extremities: extremities normal, atraumatic, no cyanosis or edema  Skin: Skin color, texture, turgor normal. No rashes or lesions  Neurologic: Grossly normal       Pertinent Labs  Lab Results: personally reviewed.   Lab Results   Component Value Date     10/01/2019    K 4.2 10/01/2019     10/01/2019    CO2 27 10/01/2019    BUN 18 10/01/2019    CREATININE 0.87 10/01/2019    CALCIUM 9.1 10/01/2019     Lab Results   Component Value Date    WBC 16.2 (H) 10/01/2019    HGB 13.1 (L) 10/01/2019    HCT 40.8 10/01/2019    MCV 89 10/01/2019     (H) 10/01/2019        Pertinent Radiology  Radiology Results: Personally reviewed image/s and Personally reviewed impression/s  EKG Results: not reviewed.

## 2021-06-01 NOTE — PROGRESS NOTES
Care Management ASIF Assessment Note:    Met with patient to review role of Care Management, progression of care and possible needs at discharge, including OP services, home care,or skilled nursing care.      [facility] [place]. Discharge needs to be determined and unknown at time of assessment due to surgery and diagnosis.      Terri Nam Care Manager ASIF   [facility].

## 2021-06-01 NOTE — PLAN OF CARE
Problem: Pain  Goal: Patient's pain/discomfort is manageable  10/2/2019 1545 by Adrianne Lopez, RN  Outcome: Progressing  10/2/2019 1047 by Adrianne Lopez, RN  Outcome: Progressing     Problem: Psychosocial Needs  Goal: Collaborate with patient/family/caregiver to identify patient specific goals for this hospitalization  10/2/2019 1545 by Adrianne Lopez, RN  Outcome: Progressing  10/2/2019 1047 by Adrianne Lopez, RN  Outcome: Progressing     VSS. Pain managed with scheduled Tylenol and PRN Toradol. PRN Toradol given x 1. Hypoactive bowel sounds. Dressing changed to midline incision. CDI. GEORGINA drain in place to right lower abdomen- 30cc out this shift. FC in place- cloudy/melinda in color- only 100cc out. 500cc bolus given this a.m. and order for a 1L bolus to be given this afternoon- running currently. Continue to monitor. Continues on IV Zosyn. Pt is NPO with ice chips- and sips of water ok with medications. Colostomy- very small amount of stool noted in bag, and small amount of air noted as well after going for a walk this afternoon. Pt took two walks this shift and tolerated well. Up with SBA. NGT removed this a.m. around 1030.

## 2021-06-01 NOTE — PLAN OF CARE
"  Problem: Pain  Goal: Patient's pain/discomfort is manageable  Outcome: Progressing     Patient able to make needs known. SBA. PRN pain medications with relief. Sleeping on and off during the night. Passing some gas into colostomy bag, and a small amount of watery stool. Urine output starting to pickup in vega, 350 ml out overnight. Went for walk this AM, tolerated well.     Vital signs stable. /67 (Patient Position: Lying)   Pulse 95   Temp 97.8  F (36.6  C) (Oral)   Resp 16   Ht 5' 10\" (1.778 m)   Wt 218 lb 14.4 oz (99.3 kg)   SpO2 94%   BMI 31.41 kg/m    "

## 2021-06-01 NOTE — ANESTHESIA POSTPROCEDURE EVALUATION
Patient: Kwaku Todd  COLECTOMY, SIGMOID, OPEN  Anesthesia type: general    Patient location: PACU  Last vitals:   Vitals Value Taken Time   /89 10/2/2019  7:17 AM   Temp 36.4  C (97.6  F) 10/2/2019  7:17 AM   Pulse 98 10/2/2019  7:17 AM   Resp 20 10/2/2019  7:17 AM   SpO2 93 % 10/2/2019  7:17 AM     Post vital signs: stable  Level of consciousness: awake and responds to simple questions  Post-anesthesia pain: pain controlled  Post-anesthesia nausea and vomiting: no  Pulmonary: unassisted, return to baseline  Cardiovascular: stable and blood pressure at baseline  Hydration: adequate  Anesthetic events: no    QCDR Measures:  ASA# 11 - Danielel-op Cardiac Arrest: ASA11B - Patient did NOT experience unanticipated cardiac arrest  ASA# 12 - Danielle-op Mortality Rate: ASA12B - Patient did NOT die  ASA# 13 - PACU Re-Intubation Rate: ASA13B - Patient did NOT require a new airway mgmt  ASA# 10 - Composite Anes Safety: ASA10A - No serious adverse event    Additional Notes:

## 2021-06-01 NOTE — PROGRESS NOTES
Feels overall better than he did prior to surgery.  Much less bloated.  Less pain.  T-max 99.4, now afebrile.  Vital signs stable.  Still a little tachycardic.  Urine output okay.  Very turbid urine.  GEORGINA output serosanguineous.    Physical exam:  Looks comfortable.  Abdomen is softer, expected tenderness.  Dressing is dry.  Stoma is pink and healthy.  GEORGINA drain is serosanguineous.    Laboratories:  Lab Results   Component Value Date    WBC 17.8 (H) 10/02/2019    HGB 13.7 (L) 10/02/2019    HCT 42.6 10/02/2019    MCV 88 10/02/2019     (H) 10/02/2019     Lab Results   Component Value Date    CREATININE 0.88 10/02/2019    BUN 21 10/02/2019     10/02/2019    K 4.6 10/02/2019     10/02/2019    CO2 26 10/02/2019     Impression: Postop day #1 sigmoid colectomy for severe acute on chronic diverticulitis.  Possible sigmoid mass but there was so much significant chronic inflammation that was hard to tell if this was just chronic diverticulitis versus a mass.  He overall looks good.  Need to continue IV antibiotics.  Encouraged him to be up moving today.  Will DC his NG tube but need to keep n.p.o. except for ice chips until he starts putting something out his stoma.

## 2021-06-01 NOTE — ED PROVIDER NOTES
EMERGENCY DEPARTMENT ENCOUNTER      NAME: Kwaku Todd  AGE: 53 y.o. male  YOB: 1965  MRN: [id number]  EVALUATION DATE & TIME: 10/1/2019  4:40 PM    PCP: Provider, No Primary Care    ED PROVIDER: Cesar Staples M.D.      Chief Complaint   Patient presents with     Abdominal Pain         FINAL IMPRESSION:  1. Diverticulitis of large intestine with perforation and abscess without bleeding    2. Other specified intestinal obstruction, unspecified whether partial or complete (H)          ED COURSE & MEDICAL DECISION MAKING:    Pertinent Labs & Imaging studies reviewed. (See chart for details)    ED Course as of Oct 02 0019   Tue Oct 01, 2019   1712 Patient is a 53-year-old male with no past medical history, does not doctor frequently.  He presents with a month of progressive abdominal pain, mild distention.  He is nontoxic on arrival, has no pain with when laying down but his abdomen is mildly distended and is firm in the suprapubic area.  He has no focal tenderness.  Differential includes small bowel obstruction, hernia, but also do abdominal mass.  He is not a drinker, less likely to be ascites.  Will get basic blood work, UA, CT of the abdomen.    [OG]   1721 WBC(!): 16.2 [OG]      ED Course User Index  [OG] Cesar Staples MD          Additional Timestamps:  5:09 PM Met with patient for initial interview and exam. Plan for care in the ED was discussed.   7:27 PM I spoke with Dr. Fong from general surgery after CT scan results returned.  She recommends starting a dose of Zosyn, n.p.o. and plan for the OR this evening.  7:35 PM I rechecked and updated the patient. He declines analgesia.  7:41 PM I spoke with Dr. Sánchez, the hospitalist.       At the conclusion of the encounter I discussed the results of all of the tests and the disposition. The questions were answered. The patient or family acknowledged understanding and was agreeable with the care plan.       MEDICATIONS GIVEN IN THE  EMERGENCY:  Medications   piperacillin-tazobactam 3.375 g in NaCl 0.9 % 50 mL (MINI-BAG Plus) (ZOSYN) (0 g Intravenous Stopped 10/1/19 2009)       NEW PRESCRIPTIONS STARTED AT TODAY'S ER VISIT  Current Discharge Medication List      CONTINUE these medications which have NOT CHANGED    Details   ibuprofen (ADVIL,MOTRIN) 200 MG tablet Take 600 mg by mouth every 6 (six) hours as needed for pain.                  =================================================================    HPI    Patient information was obtained from: Self    Use of : N/A        Kwaku Todd is a 53 y.o. male with no pertinent history who presents to this ED for evaluation of abdominal pain.     Patient reports abdominal pain started around a month ago and has gradually gotten worse. He says the pain started on the left side and now radiates to his general suprapubic region. The pain is always there but fluctuates in intensity. Today he says the pain is the worse it has been and he now feels like his epigastric area is distended. He states his BMs have been small but relatively consistent. He denies any nausea, vomiting, diarrhea, fevers, chills, testicular pain, previous abdominal surgeries, or medical problems. Patient does drink but says so only occasionally. He has no other complaints at this time.       REVIEW OF SYSTEMS   Review of Systems   Constitutional: Negative for chills and fever.   Gastrointestinal: Positive for abdominal distention and abdominal pain (suprapubic). Negative for diarrhea, nausea and vomiting.   All other systems reviewed and are negative.       PAST MEDICAL HISTORY:  History reviewed. No pertinent past medical history.    PAST SURGICAL HISTORY:  History reviewed. No pertinent surgical history.    CURRENT MEDICATIONS:    No current facility-administered medications on file prior to encounter.      Current Outpatient Medications on File Prior to Encounter   Medication Sig     ibuprofen (ADVIL,MOTRIN) 200  "MG tablet Take 600 mg by mouth every 6 (six) hours as needed for pain.       ALLERGIES:  No Known Allergies    FAMILY HISTORY:  History reviewed. No pertinent family history.    SOCIAL HISTORY:   Social History     Socioeconomic History     Marital status: Legally      Spouse name: None     Number of children: None     Years of education: None     Highest education level: None   Occupational History     None   Social Needs     Financial resource strain: None     Food insecurity:     Worry: None     Inability: None     Transportation needs:     Medical: None     Non-medical: None   Tobacco Use     Smoking status: Current Every Day Smoker     Packs/day: 1.00     Years: 30.00     Pack years: 30.00     Smokeless tobacco: Never Used   Substance and Sexual Activity     Alcohol use: Yes     Alcohol/week: 1.0 standard drinks     Types: 1 Cans of beer per week     Drug use: Never     Sexual activity: Not Currently     Partners: Female     Birth control/protection: None   Lifestyle     Physical activity:     Days per week: None     Minutes per session: None     Stress: None   Relationships     Social connections:     Talks on phone: None     Gets together: None     Attends Rastafarian service: None     Active member of club or organization: None     Attends meetings of clubs or organizations: None     Relationship status: None     Intimate partner violence:     Fear of current or ex partner: None     Emotionally abused: None     Physically abused: None     Forced sexual activity: None   Other Topics Concern     None   Social History Narrative     None       VITALS:  Patient Vitals for the past 24 hrs:   BP Temp Temp src Pulse Resp SpO2 Height Weight   10/01/19 2118 127/62 98.1  F (36.7  C) Oral 93 18 93 % 5' 10\" (1.778 m) 216 lb 9.6 oz (98.2 kg)   10/01/19 2030 -- -- -- 95 -- 92 % -- --   10/01/19 2026 -- -- -- 94 -- 93 % -- --   10/01/19 2015 -- -- -- 95 -- 92 % -- --   10/01/19 2000 -- -- -- 99 -- 92 % -- -- "   10/01/19 1945 -- -- -- 99 -- 94 % -- --   10/01/19 1818 128/70 -- -- (!) 101 -- 95 % -- --   10/01/19 1800 120/71 -- -- 88 -- 93 % -- --   10/01/19 1745 137/73 -- -- 89 -- 92 % -- --   10/01/19 1730 123/70 -- -- 88 -- -- -- --   10/01/19 1715 124/75 -- -- 91 -- 95 % -- --   10/01/19 1354 134/74 98.4  F (36.9  C) Oral 97 20 98 % -- (!) 250 lb (113.4 kg)       PHYSICAL EXAM    Constitutional: Well developed, well nourished. Comfortable appearing.  HENT: Normocephalic, atraumatic, mucous membranes moist, nose normal. Neck- Supple, gross ROM intact.   Eyes: Pupils mid-range, conjunctiva without injection, no discharge.   Respiratory: Clear to auscultation bilaterally, no respiratory distress, no wheezing, speaks full sentences easily. No cough.  Cardiovascular: Normal heart rate, regular rhythm, no murmurs. No pedal edema, 2+ DP pulses.   GI: Mildy protuberant and firm over suprapubic area, no focal tenderness.   Musculoskeletal: Moving all 4 extremities intentionally and without pain. No obvious deformity.  Skin: Warm, dry, no rash.  Neurologic: Alert & oriented x 3, cranial nerves grossly intact.  Psychiatric: Affect normal, cooperative.     LAB:  All pertinent labs reviewed and interpreted.  Results for orders placed or performed during the hospital encounter of 10/01/19   Basic Metabolic Panel   Result Value Ref Range    Sodium 136 136 - 145 mmol/L    Potassium 4.2 3.5 - 5.0 mmol/L    Chloride 101 98 - 107 mmol/L    CO2 27 22 - 31 mmol/L    Anion Gap, Calculation 8 5 - 18 mmol/L    Glucose 118 70 - 125 mg/dL    Calcium 9.1 8.5 - 10.5 mg/dL    BUN 18 8 - 22 mg/dL    Creatinine 0.87 0.70 - 1.30 mg/dL    GFR MDRD Af Amer >60 >60 mL/min/1.73m2    GFR MDRD Non Af Amer >60 >60 mL/min/1.73m2   Hepatic Profile   Result Value Ref Range    Bilirubin, Total 0.5 0.0 - 1.0 mg/dL    Bilirubin, Direct 0.3 <=0.5 mg/dL    Protein, Total 7.2 6.0 - 8.0 g/dL    Albumin 3.2 (L) 3.5 - 5.0 g/dL    Alkaline Phosphatase 71 45 - 120 U/L     AST 14 0 - 40 U/L    ALT 16 0 - 45 U/L   Lipase   Result Value Ref Range    Lipase 13 0 - 52 U/L   HM2(CBC w/o Differential)   Result Value Ref Range    WBC 16.2 (H) 4.0 - 11.0 thou/uL    RBC 4.60 4.40 - 6.20 mill/uL    Hemoglobin 13.1 (L) 14.0 - 18.0 g/dL    Hematocrit 40.8 40.0 - 54.0 %    MCV 89 80 - 100 fL    MCH 28.5 27.0 - 34.0 pg    MCHC 32.1 32.0 - 36.0 g/dL    RDW 14.5 11.0 - 14.5 %    Platelets 532 (H) 140 - 440 thou/uL    MPV 8.1 (L) 8.5 - 12.5 fL   Urinalysis-UC if Indicated   Result Value Ref Range    Color, UA Orange (!) Colorless, Yellow, Straw, Light Yellow    Clarity, UA Clear Clear    Glucose, UA Negative Negative    Bilirubin, UA Small (!) Negative    Ketones, UA Trace (!) Negative, 60 mg/dL    Specific Gravity, UA 1.031 (H) 1.001 - 1.030    Blood, UA Trace (!) Negative    pH, UA 6.0 4.5 - 8.0    Protein,  mg/dL (!) Negative mg/dL    Urobilinogen, UA 4.0 E.U./dL (!) <2.0 E.U./dL, 2.0 E.U./dL    Nitrite, UA Negative Negative    Leukocytes, UA Negative Negative    Bacteria, UA None Seen None Seen hpf    RBC, UA 0-2 None Seen, 0-2 hpf    WBC, UA 0-5 None Seen, 0-5 hpf    Squam Epithel, UA 0-5 None Seen, 0-5 lpf    Mucus, UA Many (!) None Seen lpf   Electrocardiogram, 12-lead   Result Value Ref Range    SYSTOLIC BLOOD PRESSURE      DIASTOLIC BLOOD PRESSURE      VENTRICULAR RATE 97 BPM    ATRIAL RATE 97 BPM    P-R INTERVAL 136 ms    QRS DURATION 82 ms    Q-T INTERVAL 376 ms    QTC CALCULATION (BEZET) 477 ms    P Axis 62 degrees    R AXIS 71 degrees    T AXIS 61 degrees    MUSE DIAGNOSIS       Normal sinus rhythm  Normal ECG  No previous ECGs available         RADIOLOGY:  Reviewed all pertinent imaging. Please see official radiology report.  Ct Abdomen Pelvis Without Oral With Iv Contrast    Result Date: 10/1/2019  EXAM: CT ABDOMEN PELVIS WO ORAL W IV CONTRAST LOCATION: [facility] DATE/TIME: 10/1/2019 6:39 PM INDICATION: Abd pain, possible hernia. Concerned about mass, firm suprapubic  area. COMPARISON: None. TECHNIQUE: Helical enhanced thin-section CT scan of the abdomen and pelvis was performed following injection of IV contrast. Multiplanar reformats were obtained. Dose reduction techniques were used. CONTRAST: Iohexol (Omni) 100 mL. FINDINGS: LUNG BASES: Platelike atelectasis in the right base. Calcified granuloma in the right lower lobe. ABDOMEN: Liver, spleen, pancreas, adrenal glands, and the kidneys are negative. There is distention of the colon down into the pelvis to the level of the sigmoid where there is a long region of wall thickening. Moderate inflammatory changes within the pelvis surround the sigmoid and there are diverticula present and there may be a small amount of extraluminal air. Findings suggest acute sigmoid diverticulitis with localized perforation and probable early interloop abscess formation measuring 1.6 cm between the anterior lower sigmoid and a loop of sigmoid colon in the pelvis (image 156). There is, however, some prominent shouldering in the wall thickening superiorly in the sigmoid and underlying mass lesion will need to be excluded. There is also some less advanced distention of small bowel. PELVIS: Inflammatory change which extends into the right lower abdomen. MUSCULOSKELETAL: Multilevel lumbar degenerative change.     CONCLUSION: 1.  Colonic obstruction at the level of the sigmoid where there is a long region of wall thickening which does demonstrate some shouldering superiorly and an underlying mass lesion such as carcinoma needs to be excluded. There is, however, large amount of surrounding inflammatory change and a few diverticula present and findings also suggest locally perforated sigmoid diverticulitis. Probable early interloop abscess measuring 1.6 cm with inflammatory reaction extending into the right lower abdomen. Multiple small bowel loops also dilated although to a lesser extent than the colon. NOTE: ABNORMAL REPORT THE DICTATION ABOVE DESCRIBES  AN ABNORMALITY FOR WHICH FOLLOW-UP IS NEEDED.       EKG:    Normal sinus rhythm, no acute ST or T wave changes.  No old ECGs for comparison.  No significant interval derangements.      I have independently reviewed and interpreted the EKG(s) documented above.    Procedures  none      I, Kaylen Horn, am serving as a scribe to document services personally performed by Dr. Cesar Staples based on my observation and the provider's statements to me. I, Cesar Staples MD attest that Kaylen Horn is acting in a scribe capacity, has observed my performance of the services and has documented them in accordance with my direction.    Cesar Staples M.D.  Emergency Medicine  [facility] [place]  [address] [place] [address]  Dept: [phone]  Loc: [phone]         Cesar Staples MD  10/02/19 0023

## 2021-06-01 NOTE — PROGRESS NOTES
Hospitalist Progress Note    Assessment/Plan    Kwaku Todd is a 53 y.o. male without significant medical history presents with lower abdominal pain. He was found to have perforated sigmoid diverticulitis, causing obstruction.     Acute on chronic sigmoid diverticulitis with perforation, colon obstruction: Received sigmoid colectomy on 10/1. It shows acute on chronic diverticulitis with abscess, possible mass.  - General surgery input appreciated  - Continue NPO and IV fluid  - Continue Zosyn   - Pain control  - Supportive care  - Follow up pathology    DVT prophylaxis: no heparin post op. SCD while in bed.    Code: Full code      Barriers to Discharge:  Post op recovery    Anticipated discharge date/Disposition:  2-3 days    Subjective  Patient reports that his abdominal pain is manageable. He has not passed gas yet. He has no nausea or vomiting.     Objective    Vital signs in last 24 hours  Temp:  [97.6  F (36.4  C)-99.4  F (37.4  C)] 97.6  F (36.4  C)  Heart Rate:  [] 98  Resp:  [15-24] 20  BP: (120-154)/(62-89) 130/89 93% O2 Device: Nasal cannula O2 Flow Rate (L/min): 2 L/min  Weight:   216 lb 9.6 oz (98.2 kg) Weight change:     Intake/Output last 3 shifts  I/O last 3 completed shifts:  In: 1700 [I.V.:1650; IV Piggyback:50]  Out: 585 [Urine:325; Emesis/NG output:50; Drains:110; Blood:100]  Body mass index is 31.08 kg/m .    Physical Exam    General appearance: not in acute distress  HEENT: PERRL, EOMI  Lungs: Clear breath sounds in bilateral lung fields  Cardiovascular: Regular rate and rhythm, normal S1-S2  Abdomen: Soft, mild tenderness to palpation of the surgical site, no distension, decreased bowl sound. Colostomy bag in place.  Musculoskeletal: No joint swelling  Skin: No rash and no edema  Neurology: AAO ×3.  Cranial nerves II - XII normal.  Normal muscle strength in all four extremities.      Pertinent Labs   Lab Results: personally reviewed.   Results from last 7 days   Lab Units  10/02/19  0357 10/01/19  1705   LN-SODIUM mmol/L 136 136   LN-POTASSIUM mmol/L 4.6 4.2   LN-CHLORIDE mmol/L 103 101   LN-CO2 mmol/L 26 27   LN-BLOOD UREA NITROGEN mg/dL 21 18   LN-CREATININE mg/dL 0.88 0.87   LN-CALCIUM mg/dL 8.5 9.1   LN-ALBUMIN g/dL  --  3.2*   LN-PROTEIN TOTAL g/dL  --  7.2   LN-BILIRUBIN TOTAL mg/dL  --  0.5   LN-ALKALINE PHOSPHATASE U/L  --  71   LN-ALT (SGPT) U/L  --  16   LN-AST (SGOT) U/L  --  14     Results from last 7 days   Lab Units 10/02/19  0357 10/01/19  1705   LN-WHITE BLOOD CELL COUNT thou/uL 17.8* 16.2*   LN-HEMOGLOBIN g/dL 13.7* 13.1*   LN-HEMATOCRIT % 42.6 40.8   LN-PLATELET COUNT thou/uL 579* 532*   LN-NEUTROPHILS RELATIVE PERCENT % 91*  --    LN-MONOCYTES RELATIVE PERCENT % 4  --                Medications  Scheduled Meds:    acetaminophen  500 mg Oral Q4H     docusate sodium  100 mg Oral BID     enoxaparin (LOVENOX) injection  40 mg Subcutaneous Q24H     influenza vaccine (age 50-64 YR or egg allergy) FLUBLOK quad (PF) inj 2019-20  0.5 mL Intramuscular Prior to Discharge     piperacillin-tazobactam  3.375 g Intravenous Q8H     sodium chloride  3 mL Intravenous Line Care     sodium chloride  3 mL Intravenous Line Care     Continuous Infusions:    dextrose 5 % and sodium chloride 0.45 % with KCl 20 mEq/L 100 mL/hr (10/02/19 1323)     PRN Meds:.acetaminophen, acetaminophen, HYDROmorphone, ketorolac, naloxone **OR** naloxone, ondansetron **OR** ondansetron, oxyCODONE, sodium chloride bacteriostatic    Pertinent Radiology   Radiology Results: Personally reviewed impression/s  EKG Results: personally reviewed    Advanced Care Planning:  Discharge planning discussed with patient and care team        [facility] Medicine Service  Kaleb Hernandez MD   Pager: [phone]

## 2021-06-01 NOTE — PLAN OF CARE
VSS, afebrile.  Rating abdominal pain 6-7/10. On scheduled tylenol and prn toradol. Offered ice pack but pt declined.  IVF hydration  IV Zosyn. WBC up today to 17.8 from 16.2.  Dressing to midline incision. Vessel loop drain and GEORGINA drain in place.  Urine output dark melinda. Patient had received 2 boluses for low output earlier today. About 200cc in bag at this time.  Using IS-1000. Encourage cough and deep breath as well.  Has perineal rash with some denudement on left groin. Text paged MD for nystatin powder.  Up to chair and ambulate.

## 2021-06-01 NOTE — PROGRESS NOTES
"Clinical Nutrition Therapy Assessment Note    Reason for Assessment:   Kwaku Todd is a 53 y.o. male assessed by the registered Dietitian for consult, nutrition risk screen and MD referral-nutrition risk.  Hx of diverticulitis, colon obstruction.  Pt is s/p open sigmoid colectomy 10/1-10/2.    Nutrition History:  Information from patient.  Diet prior to admission: Regular. He reports eating at most about 25% of his normal intake for the past month. He suffered from constipation, so was eating less to avoid becoming more constipated.  Recent food/fluid intake: He has been NPO since admission.    Current Nutrition Prescription:   Diet: NPO with ice chips  IV dextrose or Fluids:dextrose 5 % and sodium chloride 0.45 % with KCl 20 mEq/L, Last Rate: 100 mL/hr (10/02/19 0321)    Current Nutrition Intake:  He has been NPO since admit.    Anthropometrics:  Height: 5' 10\" (177.8 cm)  Admission weight: 250 lb vs 216 lb  Weight: 216 lb 9.6 oz (98.2 kg)  BMI (Calculated): 31.1  BMI indication: 30-34.9 obesity (class 1)  Ideal body weight 166 lb  % Ideal body weight 130%  Weight History:  Wt Readings from Last 10 Encounters:   10/01/19 216 lb 9.6 oz (98.2 kg)     Per Encounters, the pt weighed 248 lb 7/21/19. This indicates a weight loss of 12.9% in about two months (7/21-10/1), which is clinically significant.     RD Nutrition Focused Physical Exam:  The patient has the following physical signs which could indicate malnutrition: Temporal - mild and Clavicle - mild    GI Status/Output:   No BM since admission per chart.    Skin/Wound:   Boom Scale Score: 19    Surgical abdominal incision was noted.    Medications:  Medications reviewed.    Labs:  Labs reviewed    Estimated Nutrition Needs:  Assessment weight is 98.2 kg, current weight    Energy Needs: 5356-2589 kcals daily, 20-25 kcal/kg  Protein Needs: 115-145 g daily, 1.2-1.5 g/kg  Fluid Needs: 7377-6451 mls daily, 1 mL/kcal    Malnutrition: Patient meets diagnostic " criteria for moderate protein calorie malnutrition in the context of social and environmental circumstances (eating less to prevent further constipation in the setting of colon obstruction) as evidenced by unintentional weight loss (12.9% in two months), poor nutrient intake (25% of normal intake for one month PTA), subcutaneous fat loss (mild) and muscle loss (mild).    Nutrition Risk Level: high risk    Nutrition dx:   Inadequate oral intake r/t preventing further constipation PTA and inability to eat in the hospital as evidenced by patient's report and NPO diet.     Goal:   Maintain weight, Meet estimated nutrition needs, PO intake > 75% and Diet advance    Intervention:   ADAT  Will offer supps if needed    Monitoring/Evaluation:   PO, weight, labs, diet advance, need for supps    Electronically signed by:  Anila Yeh RD

## 2021-06-01 NOTE — H&P
Admission History and Physical   Kwaku Le,  1965, MRN [id number]    PCP: Provider, No Primary Care, None   Code status:  No Order       Extended Emergency Contact Information  Primary Emergency Contact: KHALIF MONTE  Mobile Phone: [phone]  Relation: Significant Other  Secondary Emergency Contact: FUENTES LE  Mobile Phone: [phone]  Relation: Sibling       Assessment and Plan   Wo53-year-old male with no significant past medical history admitted to the hospital with perforated sigmoid diverticulitis concerning for obstruction from underlying mass    Colon obstruction  ---Concerning for mass--- versus complicated acute perforated diverticulitis  --- Patient at low risk to undergo a low to moderate risk procedure.  No further testing indicated.  --- N.p.o., continue IV fluids  --- Zosyn given in the emergency room  --- CEA ordered  --- General surgery consulted  --- Continue IV pain medication    Sigmoid diverticulitis with perforation  --- Plan to the OR tonight per above  --Continue Zosyn    Nicotine addiction  --- Smokes 2 packs/day.  Discussed cessation to improve wound healing.  ---Nicotine patch    Leukocytosis -secondary to above.  Monitor with therapy    Elevated platelets -reactive.  Monitor with therapy    DVT prophylaxis; per surgery postoperatively  Code status; full code  Admit inpatient status as expect at greater than two midnight stay for treatment of colonic obstruction comp located by perforated sigmoid diverticulitis.       Chief Complaint:  Abdominal pain     HPI:    Kwaku Le is a 53 y.o. old male with no significant past medical history other than extensive tobacco abuse who came into the emergency room department for further work-up and evaluation of increasing abdominal pain.  Patient's primary historian.  He states that he has noted more constipation with small caliber stools over the past month.  He had some rotating abdominal pain in various areas but has not  been severe.  However, over the past 3 days and Saturdays he had severe abdominal pain accompanied by bloating.  Due to pain severity he came to the emergency room department.  CT scan shows signs for perforated diverticulitis concerning for underlying mass as well with obstruction and he is being admitted to the hospital.    Patient has had minimal past surgical history but is done well with anesthesia given.  He had denies any history of coronary artery disease or hypertension or diabetes.  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  He is unsure if he had any fevers or chills but has had a decreased appetite.  He smokes approximately 2 packs/day.  Minimal social alcohol use.  No recent chest pain or shortness of breath.  No recent other infections.         Medical History  No past medical history on file.     Surgical History  He  has no past surgical history on file.       Social History  Reviewed, and he    Social History     Patient does not qualify to have social determinant information on file (likely too young).   Social History Narrative     Not on file        Allergies  No Known Allergies Family History  Reviewed, and No family history on file.  No family status information on file.           Prior to Admission Medications   Medications Prior to Admission   Medication Sig Dispense Refill Last Dose     ibuprofen (ADVIL,MOTRIN) 200 MG tablet Take 600 mg by mouth every 6 (six) hours as needed for pain.   9/27/2019          Review of Systems:  A 12 point comprehensive review of systems was negative except as noted. Physical Exam:  Temp:  [98.4  F (36.9  C)] 98.4  F (36.9  C)  Heart Rate:  [] 95  Resp:  [20] 20  BP: (120-137)/(70-75) 128/70    General Appearance:    Alert, cooperative, no distress, appears stated age   Head:    Normocephalic, without obvious abnormality, atraumatic   Eyes:    conjunctiva/corneas clear, EOM's intact,     Throat:  Oropharynx is moist.   Neck:   Supple,  trachea midline, no adenopathy;     thyroid:  no enlargement/tenderness/nodules;    Back:     Spine without deformity   Lungs:     Clear to auscultation bilaterally, without wheezes, crackles, or rhonchi, respirations unlabored    Heart:    Regular rate and rhythm, S1 and S2 normal, no murmur, rub   or gallop   Abdomen:    Bloated with diffuse pain throughout.  No masses palpable.   Extremities:  Varicosities noted bilaterally, otherwise no significant bilateral LE edema, otherwise extremities normal, atraumatic, without cyanosis    Skin:  Bronzed skin appearance especially in his face, not throughout the rest of his skin.  Otherwise I see no open sores, no rashes or lesions, normal turgor and color   Lymph nodes:   Cervical, supraclavicular  nodes normal   Neurologic:  Alert and oriented.  Grossly intact without any focal deficits appreciated.        Pertinent Labs  Lab Results: personally reviewed.     Recent Results (from the past 24 hour(s))   Basic Metabolic Panel   Result Value Ref Range    Sodium 136 136 - 145 mmol/L    Potassium 4.2 3.5 - 5.0 mmol/L    Chloride 101 98 - 107 mmol/L    CO2 27 22 - 31 mmol/L    Anion Gap, Calculation 8 5 - 18 mmol/L    Glucose 118 70 - 125 mg/dL    Calcium 9.1 8.5 - 10.5 mg/dL    BUN 18 8 - 22 mg/dL    Creatinine 0.87 0.70 - 1.30 mg/dL    GFR MDRD Af Amer >60 >60 mL/min/1.73m2    GFR MDRD Non Af Amer >60 >60 mL/min/1.73m2   Hepatic Profile   Result Value Ref Range    Bilirubin, Total 0.5 0.0 - 1.0 mg/dL    Bilirubin, Direct 0.3 <=0.5 mg/dL    Protein, Total 7.2 6.0 - 8.0 g/dL    Albumin 3.2 (L) 3.5 - 5.0 g/dL    Alkaline Phosphatase 71 45 - 120 U/L    AST 14 0 - 40 U/L    ALT 16 0 - 45 U/L   Lipase   Result Value Ref Range    Lipase 13 0 - 52 U/L   HM2(CBC w/o Differential)   Result Value Ref Range    WBC 16.2 (H) 4.0 - 11.0 thou/uL    RBC 4.60 4.40 - 6.20 mill/uL    Hemoglobin 13.1 (L) 14.0 - 18.0 g/dL    Hematocrit 40.8 40.0  - 54.0 %    MCV 89 80 - 100 fL    MCH 28.5 27.0 - 34.0 pg    MCHC 32.1 32.0 - 36.0 g/dL    RDW 14.5 11.0 - 14.5 %    Platelets 532 (H) 140 - 440 thou/uL    MPV 8.1 (L) 8.5 - 12.5 fL   Urinalysis-UC if Indicated   Result Value Ref Range    Color, UA Orange (!) Colorless, Yellow, Straw, Light Yellow    Clarity, UA Clear Clear    Glucose, UA Negative Negative    Bilirubin, UA Small (!) Negative    Ketones, UA Trace (!) Negative, 60 mg/dL    Specific Gravity, UA 1.031 (H) 1.001 - 1.030    Blood, UA Trace (!) Negative    pH, UA 6.0 4.5 - 8.0    Protein,  mg/dL (!) Negative mg/dL    Urobilinogen, UA 4.0 E.U./dL (!) <2.0 E.U./dL, 2.0 E.U./dL    Nitrite, UA Negative Negative    Leukocytes, UA Negative Negative    Bacteria, UA None Seen None Seen hpf    RBC, UA 0-2 None Seen, 0-2 hpf    WBC, UA 0-5 None Seen, 0-5 hpf    Squam Epithel, UA 0-5 None Seen, 0-5 lpf    Mucus, UA Many (!) None Seen lpf         Pertinent Radiology  Radiology Results: reviewed.      Ct Abdomen Pelvis Without Oral With Iv Contrast    Result Date: 10/1/2019  EXAM: CT ABDOMEN PELVIS WO ORAL W IV CONTRAST LOCATION: [facility] DATE/TIME: 10/1/2019 6:39 PM INDICATION: Abd pain, possible hernia. Concerned about mass, firm suprapubic area. COMPARISON: None. TECHNIQUE: Helical enhanced thin-section CT scan of the abdomen and pelvis was performed following injection of IV contrast. Multiplanar reformats were obtained. Dose reduction techniques were used. CONTRAST: Iohexol (Omni) 100 mL. FINDINGS: LUNG BASES: Platelike atelectasis in the right base. Calcified granuloma in the right lower lobe. ABDOMEN: Liver, spleen, pancreas, adrenal glands, and the kidneys are negative. There is distention of the colon down into the pelvis to the level of the sigmoid where there is a long region of wall thickening. Moderate inflammatory changes within the pelvis surround the sigmoid and there are diverticula present and there may be a small amount of  extraluminal air. Findings suggest acute sigmoid diverticulitis with localized perforation and probable early interloop abscess formation measuring 1.6 cm between the anterior lower sigmoid and a loop of sigmoid colon in the pelvis (image 156). There is, however, some prominent shouldering in the wall thickening superiorly in the sigmoid and underlying mass lesion will need to be excluded. There is also some less advanced distention of small bowel. PELVIS: Inflammatory change which extends into the right lower abdomen. MUSCULOSKELETAL: Multilevel lumbar degenerative change.     CONCLUSION: 1.  Colonic obstruction at the level of the sigmoid where there is a long region of wall thickening which does demonstrate some shouldering superiorly and an underlying mass lesion such as carcinoma needs to be excluded. There is, however, large amount of surrounding inflammatory change and a few diverticula present and findings also suggest locally perforated sigmoid diverticulitis. Probable early interloop abscess measuring 1.6 cm with inflammatory reaction extending into the right lower abdomen. Multiple small bowel loops also dilated although to a lesser extent than the colon. NOTE: ABNORMAL REPORT THE DICTATION ABOVE DESCRIBES AN ABNORMALITY FOR WHICH FOLLOW-UP IS NEEDED.         EKG Results:NA

## 2021-06-01 NOTE — PLAN OF CARE
"  Problem: Pain  Goal: Patient's pain/discomfort is manageable  Outcome: Progressing     Patient arrived to floor around 2100. Reported pain 4/10, declined intervention. Independent in room, stable on feet. Sent to surgery around 2145. NPO other than a few ice chips the MD approved. Report given to PATRICK.     Vital signs stable. /62 (Patient Position: Semi-anna)   Pulse 93   Temp 98.1  F (36.7  C) (Oral)   Resp 18   Ht 5' 10\" (1.778 m)   Wt 216 lb 9.6 oz (98.2 kg)   SpO2 93%   BMI 31.08 kg/m    "

## 2021-06-01 NOTE — PLAN OF CARE
Goal: Patient s discharge needs are met.  Outcome: Care Progression reviewed with Hospitalist, Care Manager.  Discharge Disposition: Discussed and plan to discharge to:  home  Planned Discharge Date:  10/4/19  Problem: Barriers to discharge include:  post op recovery   Transportation needs/Ride Time:  LAURA Marinelli, JOEL 10/02/19 4:53 PM

## 2021-06-01 NOTE — OP NOTE
Operative Note    Name:  Kwaku ERVIN Perez  PCP:  Provider, No Primary Care  Procedure Date:  10/1/2019 - 10/2/2019      Procedure:  COLECTOMY, SIGMOID, OPEN (N/A)    Pre-Procedure Diagnosis:  Bowel obstruction (H) [K56.609]   Perforated diverticulitis    Post-Procedure Diagnosis:    Acute on chronic diverticulitis with abscess, possible mass.    Surgeon(s):  Tahira Fong MD      Anesthesia Type:  General      Findings:  Significantly inflamed and chronically scarred in sigmoid colon with abscess.  Difficult to tell whether there was truly a mass or if this was just inflammation.    Operative Report:    The patient was brought to the operating suite where he was placed in supine position.  He was prepped and draped in a sterile fashion.  A midline abdominal incision was made and the abdomen entered.  The entire colon and small bowel was significantly dilated down to an area of significant inflammation in the distal sigmoid colon.  I began dissecting along the white line of Toldt.  I divided the colon with the SARAH-75 proximal to the area of inflammation and then began taking down the mesentery with the LigaSure device working distally.  Visualization was difficult because of how dilated all of his bowel was.  To help alleviate this a small enterotomy with border of the small bowel and I suctioned out several liters of succus just to decrease the distention of the small bowel so that I could more easily work in the abdomen.  The small enterotomy was closed with interrupted 3-0 silk sutures.  I then continued the dissection of the colon.  It was very difficult as it was folded in on itself and there was clearly an abscess cavity distally which I cultured.  There was also just very clearly acute on very chronic inflammation.  I carefully took down the mesentery with the LigaSure.  Even though there was some concern on the CT scan that this was a colon cancer, I did not feel that I could be generous in getting a  significant mesentery because there was so much significant inflammation you could not clearly identify the ureters as I was doing the dissection.  Much of the dissection especially as I got further distally was done bluntly and then with the LigaSure as a came across mesenteric vessels.  Eventually I was passed the abscess cavity which was along the anterior border of the proximal rectum.  Once I was passed this I was able to clear off the proximal rectum enough to build to get a TA contour stapler across it.  The bowel was amputated at this level and the specimen passed off.  He had a very redundant colon so even after taking out this segment which was at least a foot to 18 inches long, the bowel proximal to this fell down into the pelvis easily.  I also inspected the pelvis and could clearly identify both ureters.  Although there were right along the edge of the dissection they were clearly intact.  However there was too much inflammation and the proximal bowel was significantly dilated so that I did not feel an anastomosis was safe to perform.  The abdomen was irrigated with a copious amount of saline.  A 18 [place] Bhavesh-Horn drain was brought out through a separate stab incision.  I then removed a small ellipse of skin from the left side of the abdomen and took it down to the subcutaneous tissues with electrocautery.  The fascia was incised and then the muscle divided and then widen with my fingers so that the descending colon could be pulled up through here.  This was tacked on the inside with interrupted 3-0 silk sutures.  This came up easily with no tension.     I then closed the her to the him with a running 2-0 Vicryl.  The midline fascia was closed with a running #1 PDS suture.  The skin was closed with clips.  A small drain was left in the wound to help facilitate any drainage of fluid.  I then matured the ostomy with interrupted 3-0 silk sutures.  The stoma was pink and viable.  The patient tolerated  the procedure well.  Sterile dressings were placed.  Because of how scarred in the bowel was, this dissection took longer than a typical colon resection.    Estimated Blood Loss:   100 cc    Specimens:    Sigmoid colon       Drains:   Drain Anterior Abdomen 15 Fr. (Active)       Drain Anterior Abdomen  (Active)       NG/OG Tube Nasogastric Left nostril (Active)   Placement Verification Auscultation;Surgeon verified 10/1/2019 11:20 PM   Site Assessment Clean;Dry;Intact 10/1/2019 11:20 PM   Status Suction-low intermittent 10/1/2019 11:20 PM   Drainage Appearance Bile;Bloody 10/1/2019 11:20 PM       Urethral Catheter Double-lumen;Latex 16 Fr. (Active)       Complications:    None    Tahira Fong     Date: 10/2/2019  Time: 1:25 AM

## 2021-06-01 NOTE — ED TRIAGE NOTES
The pt reports abd pain x1 month but states on Saturday the pain has gotten worse and now he feels that he has distention in his epigastric area. Denies vomiting or diarrhea.

## 2021-06-01 NOTE — PROGRESS NOTES
"WOC stoma assessment Colostomy    Allergies:  No Known Allergies    Height:  5' 10\" (1.778 m)    Weight:   216 lb 9.6 oz (98.2 kg)    Past Medical History:  History reviewed. No pertinent past medical history.    Labs:  Recent Labs     10/01/19  1705 10/02/19  0357   HGB 13.1* 13.7*   ALBUMIN 3.2*  --        Boom:  Boom Scale Score: 19    Specialty Bed:       INTAKE  Type of Stoma: Temporary Colostomy  Anticipated date of takedown: unknown  Diagnosis Pertinent to Stoma:Diverticulitis w/ perforation Date of Diagnosis: 10/1/19  Type of Surgery: Colostomy Surgery Date: 10/1/19  Surgeon: Ajit   Supplies: Pouch  [employer]ilon #[id number] -Flat 1 piece cut to fit fecal pouch and Paste  Rebuck #[id number] -Adapt Ring.    ASSESSMENT  Colostomy Assessed stoma through pouch only as patient had surgery very early this morning  Stoma Size: Round approx 1-1/2 inches  Protrusion: approx 1.5cm  Vascularity: Pink  Mucocutaneous Juncture: unable to assess  Peristomal Skin: unable to assess       TREATMENT/EQUIPMENT  NA    Supplies: Pouch  Rebuck #[id number] -Flat 1 piece cut to fit fecal pouch and Paste  Odilon #[id number] -Adapt Ring    Instructions Given: [facility] Role, Anatomy and clinic follow up    Nursing care provided was coordinated care with RN    Discussed plan of care with nurse and patient. 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 Prefers to do initial pouch change tomorrow as he knows he will not remember any teaching today due to late surgery.     Outcomes and treatment recommendations are to To contain output, To be knowledgable with pouch change/emptying before discharge and To be independant with pouch change/emptying before discharge    Actions taken by [facility] RN: 5 minutes of education.    Planned Follow Up: Later this week.    Plan for next visit: Reassess stoma/peristomal skin and Patient to change pouch with assist    "

## 2021-06-01 NOTE — PROGRESS NOTES
Pharmacy Note - Admission Medication History    Pertinent Provider Information: none     ______________________________________________________________________    Prior To Admission (PTA) med list completed and updated in EMR.       PTA Med List   Medication Sig Last Dose     ibuprofen (ADVIL,MOTRIN) 200 MG tablet Take 600 mg by mouth every 6 (six) hours as needed for pain. 9/27/2019       Information source(s): Patient and CareEverywhere/SureScripts    Summary of Changes to PTA Med List  New: ibuprofen  Discontinued: none  Changed: none    Patient was asked about OTC/herbal products specifically.  PTA med list reflects this.    Based on the pharmacist s assessment, the PTA med list information appears reliable    Patient appears adherent: N/A    Allergies were reviewed, assessed, and updated with the patient.      Patient does not use any multi-dose medications prior to admission.    Thank you for the opportunity to participate in the care of this patient.    Lisseth Nava, PharmD  10/1/2019 7:22 PM

## 2021-06-01 NOTE — ANESTHESIA CARE TRANSFER NOTE
Last vitals:   Vitals:    10/02/19 0120   BP: 133/78   Pulse: 100   Resp: 22   Temp: 36.8  C (98.2  F)   SpO2: 98%     Patient's level of consciousness is drowsy  Spontaneous respirations: yes  Maintains airway independently: yes  Dentition unchanged: yes  Oropharynx: oropharynx clear of all foreign objects    QCDR Measures:  ASA# 20 - Surgical Safety Checklist: WHO surgical safety checklist completed prior to induction    PQRS# 430 - Adult PONV Prevention: 4558F - Pt received => 2 anti-emetic agents (different classes) preop & intraop  ASA# 8 - Peds PONV Prevention: NA - Not pediatric patient, not GA or 2 or more risk factors NOT present  PQRS# 424 - Danielle-op Temp Management: 4559F - At least one body temp DOCUMENTED => 35.5C or 95.9F within required timeframe  PQRS# 426 - PACU Transfer Protocol: - Transfer of care checklist used  ASA# 14 - Acute Post-op Pain: ASA14B - Patient did NOT experience pain >= 7 out of 10

## 2021-06-01 NOTE — PLAN OF CARE
"Patient able to make needs known. Came back from PACU at about 0230. Very drowsy. Had a colostomy placed, NG tube, GEORGINA, vega and a vessel loop drain. ABD in place with some drainage from the drain. NPO other than ice chips. Declined having any real pain when arrived to floor. IVF infusing. On 2 lt of O2, with sats in the low 90's. NG to LIS. Mildly tachy.       Problem: Pain  Goal: Patient's pain/discomfort is manageable  10/2/2019 0336 by Mirna Shrestha RN  Outcome: Progressing    Vital signs stable. /67 (Patient Position: Lying)   Pulse (!) 102 Comment: told rn  Temp 97.7  F (36.5  C) (Axillary)   Resp 16   Ht 5' 10\" (1.778 m)   Wt 216 lb 9.6 oz (98.2 kg)   SpO2 95%   BMI 31.08 kg/m    "

## 2021-06-02 NOTE — PLAN OF CARE
Goal: Patient s discharge needs are met.  Outcome: Care Progression reviewed with Hospitalist, Care Manager.  Discharge Disposition: Discussed and plan to discharge to: Home  Planned Discharge Date: 10/11/19  Problem: Barriers to discharge include: GI following, Diverticulitis  Transportation needs/Ride Time: Family transport vs He transport.

## 2021-06-02 NOTE — PROGRESS NOTES
"WO stoma assessment Colostomy    Allergies:  No Known Allergies    Height:  5' 10\" (1.778 m)    Weight:   218 lb 14.4 oz (99.3 kg)    Past Medical History:  History reviewed. No pertinent past medical history.    Labs:  Recent Labs     10/01/19  1705 10/02/19  0357   HGB 13.1* 13.7*   ALBUMIN 3.2*  --        Boom:  Boom Scale Score: 18    Specialty Bed:       INTAKE  Type of Stoma: Temporary Colostomy  Anticipated date of takedown: unknown  Diagnosis Pertinent to Stoma:Diverticulitis w/ perforation Date of Diagnosis: 10/1/19  Type of Surgery: Colostomy Surgery Date: 10/1/19  Surgeon: Ajti   Supplies: Pouch  [employer] #8531 -Flat 1 piece cut to fit fecal pouch and Paste  [place] #7805 -Adapt Ring.    ASSESSMENT  Colostomy   Stoma Size: Round 1-7/8 inches  Protrusion: 2cm  Vascularity: Pink  Mucocutaneous Juncture: intact  Peristomal Skin: intact       TREATMENT/EQUIPMENT  NA    Supplies: Pouch  [employer] #8531 -Flat 1 piece cut to fit fecal pouch and Paste  [employer] #7805 -Adapt Ring    Instructions Given: [facility] Role, Activity, Anatomy, Bathing: Ostomy supplies are waterproof., Clothing, Diet, Exercise, Fluids: Drink 6-8 glasses of fluids daily , Pouching Products: Showed pouching system , clinic follow up and Ordering supplies    Nursing care provided was coordinated care with RN    Discussed plan of care with nurse and patient.     Outcomes and treatment recommendations are to To contain output, To be knowledgable with pouch change/emptying before discharge and To be independant with pouch change/emptying before discharge    Actions taken by WOC RN: 10 minutes of education and [facility] Discharge recommendations entered.    Planned Follow Up: Early next week.    Plan for next visit: Reassess stoma/peristomal skin and Patient to change pouch with assist    "

## 2021-06-02 NOTE — PLAN OF CARE
Problem: Pain  Goal: Patient's pain/discomfort is manageable  Outcome: Progressing     Pt has denied pain this shift. Refused scheduled Tylenol.      Problem: Potential for Compromised Skin Integrity  Goal: Nutritional status is improving  Outcome: Progressing     Diet advanced to regular this shift. PRN Medication given for hiccups, but denies any nausea. States gassy, but bloating has improved. Output from colostomy loose, brown in color. Air in colostomy bag. Had clear liquids for breakfast, and has some cream of wheat and broth for lunch.       Problem: Nausea  Goal: Nausea will be resolved or  Outcome: Progressing     Problem: Insufficient Nutritional Intake  Goal: Mobility/activity is maintained at optimum level for patient  Outcome: Progressing     Up independently in room and out in the hallway. Has taken two walks this shift.    VSS. Remains mildly tachycardic. No pain. Ambulating out in the hallway independently. Pt emptying colostomy bag on own. Continues on IV fluids and IV Zosyn. On the K, phos and mag protocol. PO medications given- labs readdressed per protocol.

## 2021-06-02 NOTE — PLAN OF CARE
Problem: Pain  Goal: Patient's pain/discomfort is manageable  Outcome: Progressing  C/o back pain given dilaudid with effective result.     Problem: Daily Care  Goal: Daily care needs are met  Outcome: Progressing   Patient up and walked on the cuellar way tolerated well. Abdominal incision dry and intact. Continue on NPO. C/o nausea and hiccups PRN Zofran and Thorazine was given with effect. Will continue to monitor.

## 2021-06-02 NOTE — DISCHARGE SUMMARY
[facility] MEDICINE  DISCHARGE SUMMARY     Primary Care Physician: William Prater MD  Admission Date: 10/1/2019   Discharge Provider: Shiloh Mclean Discharge Date: 10/11/2019   Diet: regular diet   Code Status: Full Code   Activity: activity as tolerated        Condition at Discharge: Good      REASON FOR PRESENTATION(See Admission Note for Details)   Diverticulitis of large intestine with perforation without bleeding.    PRINCIPAL & ACTIVE DISCHARGE DIAGNOSES     Principal Problem:    Diverticulitis  Active Problems:    Colon obstruction (H)    Cigarette nicotine dependence without complication    Abscess of male pelvis (H)    Protein-calorie malnutrition, unspecified severity (H)    Hypomagnesemia    Hypophosphatemia    Hypokalemia    Diverticulitis of large intestine with perforation without bleeding      SIGNIFICANT FINDINGS (Imaging, labs):     BMP:  Results from last 7 days   Lab Units 10/11/19  0558 10/10/19  1835 10/10/19  0618 10/09/19  1746 10/09/19  0631 10/08/19  0616 10/07/19  0609 10/06/19  0618 10/05/19  0631   LN-SODIUM mmol/L 136  --   --   --  139 138 138 139 141   LN-POTASSIUM mmol/L 3.7 3.5 3.4* 3.9 3.6 3.9 4.2 4.4 4.5   LN-CHLORIDE mmol/L 101  --   --   --  106 104 104 104 106   LN-CO2 mmol/L 27  --   --   --  25 24 24 25 29   LN-BLOOD UREA NITROGEN mg/dL 6*  --   --   --  15 21 26* 24* 26*   LN-CREATININE mg/dL 0.70  --   --   --  0.62* 0.71 0.81 0.78 0.93   LN-CALCIUM mg/dL 7.6*  --   --   --  7.5* 7.8* 8.3* 8.4* 7.9*       CBC:  Results from last 7 days   Lab Units 10/11/19  0558 10/10/19  0618 10/09/19  0631 10/08/19  0616 10/07/19  1554 10/07/19  0609 10/06/19  0618 10/05/19  0631   LN-WHITE BLOOD CELL COUNT thou/uL 14.7* 14.9* 13.8* 12.8* 15.5* 11.7*  --  7.9   LN-HEMOGLOBIN g/dL 9.7* 10.7* 9.9* 11.0* 11.8* 12.1*  --  10.8*   LN-HEMATOCRIT % 30.6* 34.2* 31.1* 34.6* 37.2* 37.9*  --  34.2*   LN-PLATELET COUNT thou/uL 554* 568* 501* 577* 594* 616* 582* 514*       BNP:         HEPATIC PROFILE:  Results from last 7 days   Lab Units 10/11/19  0558 10/08/19  0616   LN-ALT (SGPT) U/L 12 13   LN-AST (SGOT) U/L 15 12        Abscess; Body Fluid        Culture 4+ Escherichia coliAbnormal               Gram Stain Result  4+ Polymorphonuclear leukocytes      3+ Gram positive bacilli      2+ Gram negative bacilli      1+ Gram positive cocci in pairs            Resulting Agency: [facility]   Susceptibility      Escherichia coli     SONIA     Amoxicillin + Clavulanate 16/8  Intermediate     Ampicillin >16  Resistant     Cefazolin 16  Intermediate     Cefepime <=1  Sensitive     Ceftriaxone <=1  Sensitive     Ciprofloxacin <=0.5  Sensitive     Gentamicin <=2  Sensitive     Levofloxacin <=1  Sensitive     Meropenem <=0.25  Sensitive     Piperacillin + Tazobactam 8/4  Sensitive     Tetracycline <=2  Sensitive     Tobramycin <=2  Sensitive     Trimethoprim + Sulfamethoxazole <=0.5  Sensitive                   PENDING LABS      Order Current Status    Blood Culture from PERIPHERAL SITE (2nd one) In process    Blood culture from PERIPHERAL SITE In process    Culture/Gram Stain: Body Fluid Preliminary result          PROCEDURES ( this hospitalization only)      COLECTOMY, SIGMOID, OPEN    RECOMMENDATIONS TO OUTPATIENT PROVIDER FOR F/U VISIT     F/u gen surgery in one week  F/u with primary clinic ([facility]/shankar) on efforts at smoking cessation, nutritional status, issues with hiccups (may need GI consult for upper endoscopy if continued problems with hiccups)      DISPOSITION     Home    SUMMARY OF HOSPITAL COURSE:       53-year-old male, ,  previously healthy with a history of tobacco abuse admitted for abdominal pain (x one month prior to admission) found to have severe acute on  chronic sigmoid diverticulitis with perforation.     Severe acute on chronic sigmoid diverticulitis with perforation, colon obstruction status post sigmoid colectomy and colostomy postoperative  day 10  -pathology negative for malignancy.    -NG tube placed on 10/4 due to ileus with nausea and vomiting, removed on 10/6.    -Started to have fever and tachycardia with increased WBC on 10/7, CT showed new small pelvic abscess even though he postoperatively had a GEORGINA drain placed was removed a few days ago.  Lactic acid normal.  Abscess too small to place a drain but was amenable to percutaneous ct-guided aspiration.  Gram stain as below, culture pending.  --  Continued Zosyn until day of discharge.  Reviewed gramstain/culture from pelvic abscess aspiratea and sensitivities-- ecoli predominant organism intermediate sens to augmentin--so changed to oral levofloxacin and oral metronidazole (other organisms with id, sensitivities pending),  Reviewed w surg also; treat w PO abx x 10 days total, see surgery team in a week for follow up.  Reviewed with patient that there is a chance he may require further intervention given degree of inflammation noted intraoperatively.  Anticipate he'll need f/u CT imaging at some point to verify doing well.    --  WBC 13.8-->14.9-->14.7  -  Surgery following and appreciated.  Emesis resolved, No further emesis  But not great PO intake as far as volume; pt notes minimal appetite. Advancing to full liquid today  - C. difficile negative  --repeat cbc in AM     Protein calorie malnutrition, moderate  Hypomagnesemia: improved, continue magnesium supp.  Hypokalemia: improved  - really no nutritive intake since surgery d/t ileus until starting clears 10/9  - albumin 10/8 2.4  - monitored for refeeding syndrome, phos, K and Mg replacement ordered.  - tolerated advancement of diet during 2 days prior to discharge.     Acute blood loss anemia-mild.    Hemoglobin 10-11; stable.     Intermittent dyspnea- resolved.  -has mild chronic cough, sometimes feels slightly short of breath that resolves immediately, 3 times a day.   - CT showed no acute infiltrate or pulmonary embolism, primarily  atelectasis.    -Encouraged incentive and spirometry.     Resolved prior to discharge     CODY  - stop protonix, changed to PO omeprazole.     Hiccups-tried Thorazine as needed.  Improved.  - , avoid combining QT prolonging meds while on levofloxacin.     Tobacco abuse-counseled quitting.  Patch as needed.    Discharge Medications with Med changes:        Medication List      START taking these medications    acetaminophen 500 MG tablet  Dose:  1,000 mg  Commonly known as:  TYLENOL  1,000 mg, Oral, 3 times daily     calcium (as carbonate) 200 mg calcium (500 mg) chewable tablet  Dose:  200 mg  Commonly known as:  TUMS  200 mg, Oral, 4 times daily PRN     levoFLOXacin 500 MG tablet  Quantity:  10 tablet  Dose:  500 mg  Commonly known as:  LEVAQUIN  500 mg, Oral, QDaily     magnesium oxide 400 mg (241.3 mg magnesium) tablet  Quantity:  60 tablet  Dose:  400 mg  Commonly known as:  MAG-OX  400 mg, Oral, 3 times daily     metroNIDAZOLE 500 MG tablet  Quantity:  30 tablet  Dose:  500 mg  Commonly known as:  FLAGYL  500 mg, Oral, 3 times daily     omeprazole 20 MG capsule  Quantity:  30 capsule  Dose:  20 mg  Start taking on:  October 12, 2019  Commonly known as:  PriLOSEC  20 mg, Oral, Daily before breakfast     oxyCODONE 5 MG immediate release tablet  Quantity:  13 tablet  Dose:  5-10 mg  Commonly known as:  ROXICODONE  5-10 mg, Oral, Every 6 hours PRN        STOP taking these medications    ibuprofen 200 MG tablet  Commonly known as:  ADVIL,MOTRIN                Rationale for medication changes:      Levofloxacin/metronidazole x 10 days PO    Oxycodone as needed for pain (#13)    omeprazole        Consults   general surgery      Immunizations given this encounter     Immunizations Administered for This Admission     Name Date    INFLUENZA,RECOMBINANT,INJ,PF QUADRIVALENT 18+YRS  10/7/2019             Anticoagulation Information      Recent INR results:   Results from last 7 days   Lab Units 10/08/19  0616   LN-INR   1.36*     Warfarin doses (if applicable) or name of other anticoagulant: n/a      Discharge Orders     Discharge Orders   Discharge Follow Up - Ostomy Clinic   Standing Status: Future Standing Exp. Date: 10/02/20   Order Comments: Follow up at [facility] - 2 weeks after discharge from hospital or TCU  Location: [facility] [address] in [place]  Scheduling Phone number [phone]    Clinic appointments are available on Tuesdays and Thursdays.  Please arrive 15 minutes prior to your appointment time, to allow time for check-in at registration  If patients are more than 10 minutes late for an appointment, it will need to be rescheduled for a later date.     Activity as tolerated   Order Comments: Rest when tired     Other restrictions (specify):     Discharge diet - Regular   Order Comments: Eat food that is easy to digest--initially soups, yogurt, foods high in protein.  Continue to listen to your stomach and if you feel full quickly, eat smaller but more frequent meals.     Call MD:  if symptoms get worse     Call MD for:  redness or swelling     Call MD:  if pain or burning when you urinate     Call MD for:  difficulty breathing, headache or visual disturbances     Call MD for:  temperature greater than 101     Call MD for:  severe uncontrolled pain     Call MD for:  constipation (difficulty having a bowel movement)     Call MD for:  dizziness, persistent nausea or vomiting     Call MD for:  weight gain - more than two pounds in a day or five pounds in a week     Discharge Follow Up - Primary Care Clinic   Order Comments: You have worked with [facility]--see Dr Prater/Dr Weinberg/and jammie in a week.  Repeat your metabolic panel and magnesium.  Follow up on how you're feeling hira the hiccups--if they haven't improved may need further eval.     Order Specific Question Answer Comments   Follow-up in: Within 7 days      Discharge Follow-Up (specify in  comments)   Order Comments: You need to be seen within the day if you are unable to take the antibiotics by mouth (having nausea/vomiting), if you get fevers >100.5, worsening abdominal pain, or bleeding from your colostomy.    Continue to refrain from smoking!  It is so important to help with  Your healing and would help with your general health and quality of life in the decades ahead.    Consider working with [facility]   [phone]  [email or web address]     Discharge Follow-Up:  Surgeon Clinic   Order Comments: In the next week--Dr Fong's office will be contacting you with follow up.     [facility]   Standing Status: Future Standing Exp. Date: 10/02/20   Order Comments: Specify day of week: Monday and Thursday.  Supplies: Pouch  [employer] #8531 -Flat 1 piece cut to fit fecal pouch and Paste  [employer] #7805 -Adapt Ring.  Close bottom of the pouch, cut opening of pouch to correct size, remove backing, apply ring around stoma.   Apply pouching system to stoma site and hold in place for 2-5 minutes.     Order Specific Question Answer Comments   Ostomy Type Colostomy    Frequency Twice weekly      Examination     Vital Signs in last 24 hours:   Temp:  [98  F (36.7  C)-99.4  F (37.4  C)] 98  F (36.7  C)  Heart Rate:  [] 98  Resp:  [16-19] 17  BP: (124-136)/(68-80) 124/68  SpO2:  [91 %-97 %] 97 %     General appearance: alert, appears older than stated age, cooperative and no distress  Head: Normocephalic, without obvious abnormality, atraumatic  Throat: OP pink, moist.  Neck: no adenopathy, no JVD, supple, symmetrical, trachea midline and thyroid not enlarged, symmetric, no tenderness/mass/nodules  Lungs: clear to auscultation bilaterally and no crackles or wheezes.  Heart: regular rate and rhythm, S1, S2 normal, no murmur, click, rub or gallop  Abdomen: active bowel sounds.  Colostomy bud pink; liquid greenish stool in ostomy bag.  Staples over midline incision.    Extremities: extremities normal,  atraumatic, no cyanosis or edema  Pulses: 2+ and symmetric  Skin: Skin color, texture, turgor normal. No rashes or lesions  Neurologic: Grossly normal    Please see EMR for more detailed significant labs, imaging, consultant notes etc.  Total time spent on discharge: 35 minutes    Shiloh Mclean MD   [facility] Service: Ph:[phone]    CC:William Prater MD

## 2021-06-02 NOTE — PROGRESS NOTES
Clinical Nutrition Therapy Follow Up Note    Current Nutrition Prescription:   Diet: Clear Liquid  IV dextrose or Fluids:dextrose 5 % and sodium chloride 0.45 % with KCl 20 mEq/L, Last Rate: 75 mL/hr (10/04/19 0800)    Current Nutrition Intake:  The patient's current meal intake is poor <50%    He reports pushing himself too far with drinking clear liquids so is giving himself a break now. He knows to start slowly when he does start taking PO again.    Anthropometrics:  Admission weight: 250 lb vs 216 lb  Weight: 220 lb 1.6 oz (99.8 kg)    GI Status/Output:   Flatus/not much output per MD  5 mL output since 7 AM today    Skin/Wound:  Surgical abdominal incision was noted.  Per WOC note, pt's colostomy is 1-7/8 inches    Medications:  Medications reviewed.    Labs:  Labs reviewed    Malnutrition: Noted previously-moderate    Nutrition Risk Level: high risk    Nutrition dx:  Inadequate oral intake r/t preventing further constipation PTA and inability to eat in the hospital as evidenced by patient's report and NPO diet.     Goal Status:  Maintain weight-not met-fluid up  Meet estimated nutrition needs-not met  PO intake > 75%-not met  Diet advance-progressing    Intervention:  ADAT  Will offer supps if needed    Monitoring/Evaluation:  PO, weight, labs, diet advance, need for supps    Anila Yeh RD, LD

## 2021-06-02 NOTE — PROGRESS NOTES
"WOC stoma assessment Colostomy    Allergies:  No Known Allergies    Height:  5' 10\" (1.778 m)    Weight:   215 lb 1.6 oz (97.6 kg)    Past Medical History:  History reviewed. No pertinent past medical history.    Labs:  Recent Labs     10/09/19  1100  10/11/19  0558   HGB  --    < > 9.7*   ALBUMIN  --   --  2.1*   CULTURE 4+ Escherichia coli*  --   --     < > = values in this interval not displayed.       Boom:  Boom Scale Score: 20    Specialty Bed:       INTAKE  Type of Stoma: Temporary Colostomy  Anticipated date of takedown: unknown  Diagnosis Pertinent to Stoma:Diverticulitis w/ perforation Date of Diagnosis: 10/1/19  Type of Surgery: Colostomy Surgery Date: 10/1/19  Surgeon: Ajit   Supplies: Pouch  [employer] #8531 -Flat 1 piece cut to fit fecal pouch and Paste  [place] #[id number] -Adapt Ring.    ASSESSMENT  Colostomy   Stoma Size: Round 1-3/4 inches  Protrusion: 2cm  Vascularity:95% Pink, 5-10% Sloughing along base from 9-11 o'clock  Mucocutaneous Juncture: intact  Peristomal Skin: intact  Patient changed pouch with minimal assist     TREATMENT/EQUIPMENT  NA    Supplies: Pouch  [employer] #[id number] -Flat 1 piece cut to fit fecal pouch and Paste  [place] #[id number] -Adapt Ring    Instructions Given: [facility] Role, Activity, Anatomy, Bathing: Ostomy supplies are waterproof., Clothing, Diet, Exercise, Fluids: Drink 6-8 glasses of fluids daily , Home Care: if needed, Pouching Products: Showed pouching system , clinic follow up and Ordering supplies    Nursing care provided was coordinated care with RN    Discussed plan of care with nurse and patient.     Outcomes and treatment recommendations are to To contain output, To be knowledgable with pouch change/emptying before discharge and To be independant with pouch change/emptying before discharge    Actions taken by [facility] RN: 15 minutes of education.    Planned Follow Up: Early next week.    Plan for next visit: Reassess stoma/peristomal skin and Patient to change pouch with " assist

## 2021-06-02 NOTE — PLAN OF CARE
Goal: Patient s discharge needs are met.  Outcome: Care Progression reviewed with Hospitalist, Care Manager.  Discharge Disposition: Discussed and plan to discharge to: home  Planned Discharge Date:  10/4/19  Problem: Barriers to discharge include:  advance diet, IV antibiotics, await pathology results  Transportation needs/Ride Time:  LAURA Marinelli, JOEL 10/03/19 1:44 PM

## 2021-06-02 NOTE — PROGRESS NOTES
Feels much better today.  NG tube is out.  Afebrile, vital signs stable.    Physical exam:  Looks much better.  Less diaphoretic.  Abdomen is soft, nontender.  Incision looks good.  GEORGINA drain is serous.    Impression: Postop day #5 sigmoid colectomy for severe acute on chronic sigmoid diverticulitis.  Looks much better today.  NG came out this morning.  Will advance diet today.  Anticipate home tomorrow.

## 2021-06-02 NOTE — PLAN OF CARE
Problem: Pain  Goal: Patient's pain/discomfort is manageable  Outcome: Progressing     Problem: Discharge Barriers  Goal: Patient's discharge needs are met  Outcome: Progressing     Pt reported rough night d/t nausea. Reports feeling better earlier in the AM, went on 2 walks in hallway independently. Mid morning pt vomited x2, nausea. NG tube placed & verified. Connected to LIS. Pt reporting relief after ~2000ml from NG. Currently sleeping. Pain tolerable throughout day. GEORGINA w/ minimal ouput. Dressing to midline incision changed dt soiled over NOC. Replaced later today dt vomit soiled outside of dressing. Skin CDI. IV site WNL.

## 2021-06-02 NOTE — PROGRESS NOTES
"WO stoma assessment Colostomy    Allergies:  No Known Allergies    Height:  5' 10\" (1.778 m)    Weight:   214 lb 8 oz (97.3 kg)    Past Medical History:  History reviewed. No pertinent past medical history.    Labs:  Recent Labs     10/07/19  0609   HGB 12.1*       Boom:  Boom Scale Score: 18    Specialty Bed:       INTAKE  Type of Stoma: Temporary Colostomy  Anticipated date of takedown: unknown  Diagnosis Pertinent to Stoma:Diverticulitis w/ perforation Date of Diagnosis: 10/1/19  Type of Surgery: Colostomy Surgery Date: 10/1/19  Surgeon: Ajit   Supplies: Pouch  [employer] #[id number] -Flat 1 piece cut to fit fecal pouch and Paste  [employer] #[id number] -Adapt Ring.    ASSESSMENT  Colostomy   Stoma Size: Round 1-3/4 inches  Protrusion: 2cm  Vascularity: Pink  Mucocutaneous Juncture: intact  Peristomal Skin: intact  Patient changed pouch with minimal assist     TREATMENT/EQUIPMENT  NA    Supplies: Pouch  [employer] #[id number] -Flat 1 piece cut to fit fecal pouch and Paste  [employer] #[id number] -Adapt Ring    Instructions Given: [facility] Role, Activity, Anatomy, Bathing: Ostomy supplies are waterproof., Clothing, Diet, Exercise, Fluids: Drink 6-8 glasses of fluids daily , Home Care: if needed, Pouching Products: Showed pouching system , clinic follow up and Ordering supplies    Nursing care provided was coordinated care with RN    Discussed plan of care with nurse and patient.     Outcomes and treatment recommendations are to To contain output, To be knowledgable with pouch change/emptying before discharge and To be independant with pouch change/emptying before discharge    Actions taken by [facility] RN: 15 minutes of education.    Planned Follow Up: Later this week.    Plan for next visit: Reassess stoma/peristomal skin and Patient to change pouch with assist    "

## 2021-06-02 NOTE — PLAN OF CARE
Problem: Pain  Goal: Patient's pain/discomfort is manageable  Outcome: Progressing  PRN dilaudid given for chronic back pain. When rounded upon pt has been sleeping.      Problem: Potential for Compromised Skin Integrity  Goal: Skin integrity is maintained or improved  Outcome: Progressing  Pt is independent in his room. He has walked the halls and is able to manage his colostomy independently.     Drsg changed to bottom of midline incision and old GEORGINA site.  Pt has been up walking. Passing flatus in his colostomy. Output in watery brown. Will continue to monitor pt status.  Melinda Guerrier RN

## 2021-06-02 NOTE — PLAN OF CARE
Problem: Pain  Goal: Patient's pain/discomfort is manageable  Outcome: Adequate for Discharge     Problem: Potential for Compromised Skin Integrity  Goal: Nutritional status is improving  Outcome: Adequate for Discharge     Problem: Nausea  Goal: Nausea will be resolved or  Outcome: Adequate for Discharge     Problem: Discharge Barriers  Goal: Patient's discharge needs are met  Outcome: Adequate for Discharge     VSS. Denies pain. Adequate output from colostomy. Staples removed and steri strips applied to mid line incision. IV antibiotics changed to PO antibiotics and first dose given this shift. Tolerating a regular diet with no nausea. OK to discharge per MD . Pt to f/u with PCP, surgery, and WOC. Discharge paperwork and instructions discussed with pt. All questions answered. Pt's gf here to  pt and to drive him home. Pt down to the front of the hospital for discharge.

## 2021-06-02 NOTE — PRE-PROCEDURE
Procedure Name: CT guided pelvic collection aspiration with moderate sedation  Date/Time: 10/9/2019 10:20 AM  Written consent obtained?: Yes  Risks and benefits: Risks, benefits and alternatives were discussed  Consent given by: patient  Expected level of sedation: moderate  ASA Class: Class 2- mild systemic disease, no acute problems, no functional limitations  Mallampati: Grade 2- soft palate, base of uvula, tonsillar pillars, and portion of posterior pharyngeal wall visible  Patient states understanding of procedure being performed: Yes  Patient's understanding of procedure matches consent: Yes  Procedure consent matches procedure scheduled: Yes  Appropriately NPO: yes  Lungs: lungs clear with good breath sounds bilaterally  Heart: normal heart sounds and rate  History & Physical reviewed: History and physical reviewed and no updates needed  Statement of review: I have reviewed the lab findings, diagnostic data, medications, and the plan for sedation

## 2021-06-02 NOTE — PLAN OF CARE
Pt rates abdominal pain #4. Tylenol given. Throat pain #1. BS+ Hypoactive. Burping. Pt reports bloating and distention. Cautioned about intake of fluids and ice. Reglan given.  Colostomy putting out brown stool. Pt managing emptying. GEORGINA minimal output. Incision covered and dry. Pt ambulating in halls independently. IV fluids and IV antibxs. Care plan reviewed.   Problem: Pain  Goal: Patient's pain/discomfort is manageable  Outcome: Progressing     Problem: Safety  Goal: Patient will be injury free during hospitalization  Outcome: Progressing     Problem: Daily Care  Goal: Daily care needs are met  Outcome: Progressing     Problem: Psychosocial Needs  Goal: Demonstrates ability to cope with hospitalization/illness  Outcome: Progressing  Goal: Collaborate with patient/family/caregiver to identify patient specific goals for this hospitalization  Outcome: Progressing     Problem: Discharge Barriers  Goal: Patient's discharge needs are met  Outcome: Progressing     Problem: Knowledge Deficit  Goal: Patient/family/caregiver demonstrates understanding of disease process, treatment plan, medications, and discharge instructions  Outcome: Progressing     Problem: Potential for Falls  Goal: Patient will remain free of falls  Outcome: Progressing     Problem: Potential for Compromised Skin Integrity  Goal: Skin integrity is maintained or improved  Outcome: Progressing     Problem: Urinary Incontinence  Goal: Perineal skin integrity is maintained or improved  Outcome: Progressing     Problem: Risk for Fluid Volume Deficit  Goal: Deficient fluid volume related to excessive extracellular fluid losses or decreased fluid intake  Outcome: Progressing     Problem: Fluid volume excess  Goal: Excess fluid volume related to increased water and/or sodium retention  Outcome: Progressing

## 2021-06-02 NOTE — PLAN OF CARE
VSS.   Prn dilaudid for back pain with good relief.    Passing gas through ostomy. Brown watery output.   Hypoactive BS.   Having some hiccups and belching, prn thorazine given with good relief.     Incision ledy, intact. Skin around incision, indurated and reddened. No pain. No drainage noted.     K, Mg and Ph protocol.   WBC 14,7 this morning. On iv zosyn.

## 2021-06-02 NOTE — PLAN OF CARE
Problem: Psychosocial Needs  Goal: Collaborate with patient/family/caregiver to identify patient specific goals for this hospitalization  Outcome: Progressing   Dr. Palma here at beginning of shift, changed pt to clear liquids from full liquids. Hypo bowel sounds. Ordered Reglan q 6 hrs PRN, first dose given at 1631. Pt ambulating in room and hallways independently.       Problem: Pain  Goal: Patient's pain/discomfort is manageable  Outcome: Progressing   Dilaudid 0.5 mg given at 2021 for 7/10 pain. Helpful.

## 2021-06-02 NOTE — TELEPHONE ENCOUNTER
I had left a voice mail message regarding the 22nd, but you may not have received it.  You are on the schedule, per Dr. Fong.      Thank you,    Susana

## 2021-06-02 NOTE — PLAN OF CARE
Plan is for patient to discharge today.  Plan is to discharge Home. Family (Transport) at discharge.

## 2021-06-02 NOTE — PROGRESS NOTES
Feels miserable.  Nauseous.  Has thrown up a couple times.  Afebrile, pulse 113, blood pressure stable.    Physical exam:  Abdomen is a bit more distended.  His stoma looks good and he has air and a small amount of stool in the stoma bag.  GEORGINA drain is serosanguineous.  Incision looks good with serous drainage from the inferior aspect which is expected.    Laboratories:  Lab Results   Component Value Date    WBC 6.0 10/04/2019    HGB 11.8 (L) 10/04/2019    HCT 36.6 (L) 10/04/2019    MCV 88 10/04/2019     (H) 10/04/2019     (H) 10/04/2019     Impression: Postop day #3 difficult sigmoid colectomy with colostomy.  He clearly has a ileus.  Needs an NG replaced.  I think he just pushed it too quickly yesterday with how much he drank.  He is already starting to resolve but I think he will feel better the NG tube.    On a good note, his pathology showed only severe acute on chronic diverticulitis with stricture formation and abscess.  No malignancy seen.  They did see several polyps also.    Alexis: NG to low continuous suction.  Encouraged him to be up moving his much as he can.  Minimize narcotics.

## 2021-06-02 NOTE — PLAN OF CARE
Problem: Knowledge Deficit  Goal: Patient/family/caregiver demonstrates understanding of disease process, treatment plan, medications, and discharge instructions  Outcome: Progressing   Continue with NG  Ambulate at least four times a day  Colosotmy bag teaching  IV ABX    Problem: Pain  Goal: Patient's pain/discomfort is manageable  Outcome: Progressing   Pain managed  PRN dilaudid

## 2021-06-02 NOTE — PLAN OF CARE
Pt tachycardic with a fever at the start of this shift. Dr. Palma notified. New orders given to check the following: CXR, BC x2, C.Diff, PE run, and CT of abdomen/pelvis in regards to this. CXR shows probable atelectasis in the right medial lung. The PE run was negative. BC and C.Diff results pending. CT of abdomen/pelvis shows a small abscess for which he will have a CT abscess drain done tomorrow. Pt was also restarted on IVF.  Pt had an emesis x2 this evening. The first one was after drinking oral contrast. Pt has received 1 dose of IV compazine, 1 dose of IV reglan, and 2 doses of IV zofran this shift. If the pt develops intractable nausea/vomiting, surgery will need to be called per Dr. Palma.  Pt has been NPO this shift and will remain NPO until after procedure.  Dilaudid given x1 for back pain. Melatonin given at HS for sleep.   Pt has ambulated in the halls several times and tolerated well.  He reports a bloated/full feeling in his abdomen.    IS use encouraged.

## 2021-06-02 NOTE — PLAN OF CARE
Problem: Knowledge Deficit  Goal: Patient/family/caregiver demonstrates understanding of disease process, treatment plan, medications, and discharge instructions  Outcome: Progressing  Continue with IVF  NG low intermittent suction- monitor output  Colectomy bag education    Problem: Pain  Goal: Patient's pain/discomfort is manageable  Outcome: Progressing   Continue to monitor pain- tolerable at this time

## 2021-06-02 NOTE — PROGRESS NOTES
No complaints.  Tolerating clear liquids well.  T-max 99.6.  Afebrile since aspiration.    Physical exam:  Looks comfortable.  Abdomen is soft, mildly distended but hard to know if that is really distention or just his chronic state.  Much gas and stool out his stoma.  Stoma is healthy.    Impression: Postop day #9 sigmoid colectomy for severe acute on chronic diverticulitis with abscess and obstruction.  Yesterday he underwent a percutaneous aspiration of a deep pelvic abscess.  It was too small for them to place a drain.  Overall he is improved.  Only time is going to tell if the aspiration alone is can be adequate.  We will check a white blood count today.  If it is heading the right direction then he could potentially be discharged home today with oral antibiotics.

## 2021-06-02 NOTE — PLAN OF CARE
Patient tolerating regular diet without pain or nausea, but did c/o belching. Thorazine given X 1 this shift.    Pt. Medicated with Dilaudid 0.5 mg IV X 1 for back pain.

## 2021-06-02 NOTE — PROGRESS NOTES
Progress Note    Assessment/Plan    Principal Problem:    Diverticulitis  Active Problems:    Colon obstruction (H)    Cigarette nicotine dependence without complication    Acute on chronic sigmoid diverticulitis with perforation, colon obstruction status post sigmoid colectomy and colostomy postoperative day 6-pathology shows no malignancy.  NG tube placed on 10/4 due to ileus with nausea and vomiting, removed on 10/6, diet being advanced but having hiccups, bloating improved, no emesis.  Colostomy now putting out liquid instead of more formed material yesterday, having fever and worsening leukocytosis, lactic acid unremarkable.  Mild distention in abdomen but no increased pain.  Differential diagnosis includes abdominal process such as abscess versus C. difficile, less likely PE.  Chest x-ray likely atelectasis, blood culture pending, lactic acid normal.  Continue Zosyn, will get CT of chest to rule out PE and CT of abdomen and pelvis to rule out abscess.  Check C. difficile.  N.p.o. for now.  Discussed with Dr. Fong, awaiting CT studies.  N.p.o.      Acute blood loss anemia-mild.  Recheck.  Decrease likely hemodilution.    Intermittent dyspnea-has mild chronic cough, sometimes feels slightly short of breath that resolves immediately, 3 times a day.  Check CT PE run.    Hiccups-surgery trying Thorazine as needed    Tobacco abuse-counseled quitting.  Patch as needed.    DVT Prophylaxis   Lovenox    Addendum: CT of the abdomen and pelvis showed a small 3 to 4 cm pelvic abscess along with extensive portal venous gas and a new distal small bowel obstruction likely related to the abscess.  Discussed radiology reading with Dr. Knight, will schedule a CT-guided abscess drain tomorrow afternoon, received Lovenox DVT prophylaxis at about 2 PM so we will have to wait 24 hours.  No need for NG tube at this point as these findings are more likely from an ileus rather than a true bowel obstruction as he is putting out  stool in his colostomy.  Patient did have one episode of emesis probably related to the large amount of oral contrast, discussed with radiology, patient and Dr. Knight.  N.p.o., continue IV fluid and Zosyn.    Subjective  Patient feeling better but had a fever of 100.7 and increased WBC to 15 today.  Hiccups improved with Thorazine .  Colostomy output now liquid, was more formed yesterday.  Has a chronic cough, nonproductive, unchanged.  About 3 times a day he feels a sudden need to take a deep breath but this resolves, currently denies shortness of breath.  No chest pain or palpitations or dizziness.  Voiding with no dysuria.       Past Medical History  History reviewed. No pertinent past medical history.    Current Medications  Scheduled Meds:    acetaminophen  1,000 mg Oral TID     enoxaparin (LOVENOX) injection  40 mg Subcutaneous Q24H     miconazole   Topical BID     omeprazole  20 mg Oral QAM AC     piperacillin-tazobactam  3.375 g Intravenous Q8H     sodium chloride  3 mL Intravenous Line Care     Continuous Infusions:    sodium chloride 0.9%       PRN Meds:.acetaminophen, aluminum-magnesium hydroxide-simethicone, benzocaine-menthol, calcium (as carbonate), chlorproMAZINE, HYDROmorphone, metoclopramide, naloxone **OR** naloxone, nicotine, ondansetron **OR** ondansetron, oxyCODONE, phenol-phenolate sodium, prochlorperazine, sodium chloride bacteriostatic  No Known Allergies    Objective  Vital signs in last 24 hours  Temp:  [97.6  F (36.4  C)-100.7  F (38.2  C)] 100.7  F (38.2  C)  Heart Rate:  [] 129  Resp:  [18] 18  BP: (135-159)/(78-92) 153/78  Weight:   214 lb 8 oz (97.3 kg)      Wt Readings from Last 2 Encounters:   10/06/19 1751 214 lb 8 oz (97.3 kg)   10/05/19 1540 217 lb (98.4 kg)   10/04/19 1523 217 lb (98.4 kg)   10/03/19 1954 220 lb 1.6 oz (99.8 kg)   10/02/19 1546 218 lb 14.4 oz (99.3 kg)   10/01/19 2118 216 lb 9.6 oz (98.2 kg)   10/01/19 1354 (!) 250 lb (113.4 kg)   Weight change: -2 lb 8  "oz (-1.134 kg)    Intake/Output last 3 shifts  I/O last 3 completed shifts:  In: 1603.5 [P.O.:720; I.V.:726; IV Piggyback:157.5]  Out: 1710 [Urine:625; Drains:10; Stool:1075]  Intake/Output this shift:  No intake/output data recorded.    Review of Systems   A 12 point comprehensive review of systems was negative except as noted.    Physical Exam    Temp:  [97.6  F (36.4  C)-100.7  F (38.2  C)] 100.7  F (38.2  C)  Heart Rate:  [] 129  Resp:  [18] 18  BP: (135-159)/(78-92) 153/78    /78 (Patient Position: Lying)   Pulse (!) 129   Temp (!) 100.7  F (38.2  C) (Oral)   Resp 18   Ht 5' 10\" (1.778 m)   Wt 214 lb 8 oz (97.3 kg)   SpO2 92%   BMI 30.78 kg/m    General appearance: alert, appears stated age and cooperative  Head: Normocephalic, without obvious abnormality, atraumatic  Eyes: Clear conjuctiva  Neck: no adenopathy, no carotid bruit, no JVD, supple, symmetrical, trachea midline and thyroid not enlarged, symmetric, no tenderness/mass/nodules  Lungs: clear to auscultation bilaterally  Heart: regular rate and rhythm and q  Abdomen: Slightly distended, GEORGINA drain putting out serosanguineous red fluid in right lower quadrant, bandaged midline incision, hypoactive bowel sounds colostomy putting out brown liquid  Extremities: Homans sign is negative, no sign of DVT  Skin: Skin color, texture, turgor normal. No rashes or lesions  Neurologic: Mental status: Alert, oriented, thought content appropriate  Cranial nerves: normal  Sensory: normal  Motor:grossly normal      Pertinent Labs   Lab Results: personally reviewed.   Labs from the previous 24 hours  Recent Results (from the past 24 hour(s))   HM2(CBC w/o Differential)   Result Value Ref Range    WBC 11.7 (H) 4.0 - 11.0 thou/uL    RBC 4.32 (L) 4.40 - 6.20 mill/uL    Hemoglobin 12.1 (L) 14.0 - 18.0 g/dL    Hematocrit 37.9 (L) 40.0 - 54.0 %    MCV 88 80 - 100 fL    MCH 28.0 27.0 - 34.0 pg    MCHC 31.9 (L) 32.0 - 36.0 g/dL    RDW 14.6 (H) 11.0 - 14.5 %    " Platelets 616 (H) 140 - 440 thou/uL    MPV 8.0 (L) 8.5 - 12.5 fL   Basic Metabolic Panel   Result Value Ref Range    Sodium 138 136 - 145 mmol/L    Potassium 4.2 3.5 - 5.0 mmol/L    Chloride 104 98 - 107 mmol/L    CO2 24 22 - 31 mmol/L    Anion Gap, Calculation 10 5 - 18 mmol/L    Glucose 112 70 - 125 mg/dL    Calcium 8.3 (L) 8.5 - 10.5 mg/dL    BUN 26 (H) 8 - 22 mg/dL    Creatinine 0.81 0.70 - 1.30 mg/dL    GFR MDRD Af Amer >60 >60 mL/min/1.73m2    GFR MDRD Non Af Amer >60 >60 mL/min/1.73m2   Lactic Acid   Result Value Ref Range    Lactic Acid 1.1 0.5 - 2.2 mmol/L   HM1 (CBC with Diff)   Result Value Ref Range    WBC 15.5 (H) 4.0 - 11.0 thou/uL    RBC 4.25 (L) 4.40 - 6.20 mill/uL    Hemoglobin 11.8 (L) 14.0 - 18.0 g/dL    Hematocrit 37.2 (L) 40.0 - 54.0 %    MCV 88 80 - 100 fL    MCH 27.8 27.0 - 34.0 pg    MCHC 31.7 (L) 32.0 - 36.0 g/dL    RDW 14.7 (H) 11.0 - 14.5 %    Platelets 594 (H) 140 - 440 thou/uL    MPV 8.3 (L) 8.5 - 12.5 fL    Neutrophils % 89 (H) 50 - 70 %    Lymphocytes % 5 (L) 20 - 40 %    Monocytes % 4 2 - 10 %    Eosinophils % 1 0 - 6 %    Basophils % 0 0 - 2 %    Neutrophils Absolute 13.8 (H) 2.0 - 7.7 thou/uL    Lymphocytes Absolute 0.8 0.8 - 4.4 thou/uL    Monocytes Absolute 0.6 0.0 - 0.9 thou/uL    Eosinophils Absolute 0.1 0.0 - 0.4 thou/uL    Basophils Absolute 0.0 0.0 - 0.2 thou/uL     Labs from the previous 7 days:   Results from last 7 days   Lab Units 10/07/19  1554 10/07/19  0609 10/06/19  0618 10/05/19  0631  10/02/19  0357   LN-WHITE BLOOD CELL COUNT thou/uL 15.5* 11.7*  --  7.9   < > 17.8*   LN-HEMOGLOBIN g/dL 11.8* 12.1*  --  10.8*   < > 13.7*   LN-PLATELET COUNT thou/uL 594* 616* 582* 514*   < > 579*   LN-NEUTROPHILS RELATIVE PERCENT % 89*  --   --   --   --  91*   LN-MONOCYTES RELATIVE PERCENT % 4  --   --   --   --  4    < > = values in this interval not displayed.     Results from last 7 days   Lab Units 10/07/19  0609 10/06/19  0618 10/05/19  0631 10/04/19  0734 10/02/19  0357  10/01/19  1705   LN-SODIUM mmol/L 138 139 141 139 136 136   LN-POTASSIUM mmol/L 4.2 4.4 4.5 4.8 4.6 4.2   LN-CHLORIDE mmol/L 104 104 106 102 103 101   LN-CO2 mmol/L 24 25 29 28 26 27   LN-BLOOD UREA NITROGEN mg/dL 26* 24* 26* 27* 21 18   LN-CREATININE mg/dL 0.81 0.78 0.93 0.94 0.88 0.87   LN-CALCIUM mg/dL 8.3* 8.4* 7.9* 7.9* 8.5 9.1         Results from last 7 days   Lab Units 10/01/19  1705   LN-ALBUMIN g/dL 3.2*   LN-PROTEIN TOTAL g/dL 7.2   LN-BILIRUBIN TOTAL mg/dL 0.5   LN-ALKALINE PHOSPHATASE U/L 71   LN-ALT (SGPT) U/L 16   LN-AST (SGOT) U/L 14           Other labs:   No results found for: INR, PROTIME    Pertinent Radiology   Personally reviewed radiologists report and impressions  Xr Chest 1 View Portable    Result Date: 10/7/2019  EXAM: XR CHEST 1 VIEW PORTABLE LOCATION: [facility] DATE/TIME: 10/7/2019 4:04 PM INDICATION: cough COMPARISON: None.     There is probable atelectasis at the right medial lung base with elevation of the right hemidiaphragm. The left lung is clear. There is no pneumothorax or pleural effusion. The heart size and pulmonary vascularity are normal.    Xr Abdomen Ap Portable    Result Date: 10/4/2019  EXAM: XR ABDOMEN AP PORTABLE LOCATION: [facility] DATE/TIME: 10/4/2019 12:31 PM INDICATION: NG tube placement. COMPARISON: 10/4/2019 11:33 AM abdominal radiograph and CT 10/1/2019.     Nasogastric suction tube has been repositioned with tip now in the mid stomach in good position. Partially visualized persistent dilatation of loops of small and large bowel. Mild gaseous distention of the stomach. Mild platelike atelectasis right lower lung again seen.        Xr Abdomen Ap Portable    Result Date: 10/4/2019  EXAM: XR ABDOMEN AP PORTABLE LOCATION: [facility] DATE/TIME: 10/4/2019 11:41 AM INDICATION: NG tube placement verification COMPARISON: None.     The distal aspect of the nasogastric tube is seen within the midline chest at the level of the aashish. It is  difficult to tell with certainty whether or not this lies within the mid esophagus rather than the airway. Gaseous distention of the stomach and visualized portions of the small bowel are noted within the limited portions of the upper abdomen.    Ct Abdomen Pelvis Without Oral With Iv Contrast    Result Date: 10/1/2019  EXAM: CT ABDOMEN PELVIS WO ORAL W IV CONTRAST LOCATION: [facility] DATE/TIME: 10/1/2019 6:39 PM INDICATION: Abd pain, possible hernia. Concerned about mass, firm suprapubic area. COMPARISON: None. TECHNIQUE: Helical enhanced thin-section CT scan of the abdomen and pelvis was performed following injection of IV contrast. Multiplanar reformats were obtained. Dose reduction techniques were used. CONTRAST: Iohexol (Omni) 100 mL. FINDINGS: LUNG BASES: Platelike atelectasis in the right base. Calcified granuloma in the right lower lobe. ABDOMEN: Liver, spleen, pancreas, adrenal glands, and the kidneys are negative. There is distention of the colon down into the pelvis to the level of the sigmoid where there is a long region of wall thickening. Moderate inflammatory changes within the pelvis surround the sigmoid and there are diverticula present and there may be a small amount of extraluminal air. Findings suggest acute sigmoid diverticulitis with localized perforation and probable early interloop abscess formation measuring 1.6 cm between the anterior lower sigmoid and a loop of sigmoid colon in the pelvis (image 156). There is, however, some prominent shouldering in the wall thickening superiorly in the sigmoid and underlying mass lesion will need to be excluded. There is also some less advanced distention of small bowel. PELVIS: Inflammatory change which extends into the right lower abdomen. MUSCULOSKELETAL: Multilevel lumbar degenerative change.     CONCLUSION: 1.  Colonic obstruction at the level of the sigmoid where there is a long region of wall thickening which does demonstrate some  shouldering superiorly and an underlying mass lesion such as carcinoma needs to be excluded. There is, however, large amount of surrounding inflammatory change and a few diverticula present and findings also suggest locally perforated sigmoid diverticulitis. Probable early interloop abscess measuring 1.6 cm with inflammatory reaction extending into the right lower abdomen. Multiple small bowel loops also dilated although to a lesser extent than the colon. NOTE: ABNORMAL REPORT THE DICTATION ABOVE DESCRIBES AN ABNORMALITY FOR WHICH FOLLOW-UP IS NEEDED.           Advanced Care Planning  Discharge Planning : Anticipate discharge in2-3  Factors affecting discharge: Fever work-up  Discussed care with patient, surgery for total time 35 minutes with greater than 50% of total time spent in counseling and coordination of care.    Rickey Palma MD  [facility] Hospitalist    Office: [phone]

## 2021-06-02 NOTE — PROGRESS NOTES
Overall feels well.  Pain adequately controlled.  Afebrile, vital signs stable.  Still a little tacky.  Urine output improving.      Physical exam:  Looks comfortable.  Abdomen is soft, minimal tenderness.  Fair amount of drainage from his midline incision which is expected.  Stoma is pink.  There is gas in the stoma bag.    Impression: Postop day #2 difficult sigmoid colectomy with diverting colostomy.  Overall looks good.  Urine output is improving.  Passing gas from his stoma so we will start clear liquids.  Continue IV antibiotics.  Awaiting final pathology.  Encourage increase activity.

## 2021-06-02 NOTE — PLAN OF CARE
"  Problem: Pain  Goal: Patient's pain/discomfort is manageable  Outcome: Progressing     Problem: Potential for Compromised Skin Integrity  Goal: Skin integrity is maintained or improved  Outcome: Progressing     Problem: Potential for Compromised Skin Integrity  Goal: Nutritional status is improving  Outcome: Progressing     VSS. Pt mildly tachy. Pt reports increased pain/discomfort today compared to yesterday. Is now POD#2.  Pain controlled by ambulation, scheduled Tylenol, PRN Toradol given x 1 and PRN Dilaudid given x 1. Pt reports pain is worse with coughing. Has ambulated x 2 this shift and has been up to the chair x 2. FC removed this a.m. and pt able to void this afternoon without difficulty. Total output was 350cc this shift. GEORGINA in place- 50cc out of serosanguineous fluid. Incision to abdomen CDI, sutures and vessel loop in place. Dressing changed x 1. Hypoactive bowel sounds and air noted in the colostomy bag. CWON changed bag this shift. Diet advanced to clear liquids this shift. Pt had some broth for breakfast and stated felt \"full\". No nausea. Denied anything for lunch. Drinking ice water without difficulty.   "

## 2021-06-02 NOTE — PLAN OF CARE
Problem: Pain  Goal: Patient's pain/discomfort is manageable  Outcome: Progressing     Problem: Psychosocial Needs  Goal: Demonstrates ability to cope with hospitalization/illness  Outcome: Progressing     Problem: Potential for Compromised Skin Integrity  Goal: Nutritional status is improving  Outcome: Progressing   Patient reports abdominal discomfort @ 4/10. Bowel sounds +. Diet advanced to Full liquid. AMB independently in halls.  IVF and antibiotics.

## 2021-06-02 NOTE — PLAN OF CARE
Goal: Patient s discharge needs are met.  Outcome: Care Progression reviewed with Hospitalist, Care Manager.  Discharge Disposition: Discussed and plan to discharge to: Home with support of significant other & family; outpatient f/u  Planned Discharge Date: 1-2 days  Problem: Barriers to discharge include: Pain management, monitor labwork, IVF, IV abx, diet advancement, hiccups, recommendations from Surgery, clinical progression  Transportation needs/Ride Time: TBD

## 2021-06-02 NOTE — PROGRESS NOTES
Patient has the hiccups.  I think everybody thought this was belching but when I asked him about it it really appears to be more hiccups than it is belching.  He is tolerating liquids well.  He is putting a large amount out his ostomy.  Afebrile, vital signs stable.  Very little out his GEORGINA drain.    Physical exam:  Looks comfortable.  Frequent hiccups.  Abdomen is soft and nontender.  Stoma looks perfect.  Incision looks good with no signs of infection.  GEORGINA is just serous.    Lab Results   Component Value Date    WBC 11.7 (H) 10/07/2019    HGB 12.1 (L) 10/07/2019    HCT 37.9 (L) 10/07/2019    MCV 88 10/07/2019     (H) 10/07/2019       Impression: Stop day #6 sigmoid colectomy for severe diverticulitis.  My only concern with the hiccups is that sometimes that can be secondary to irritation of the diaphragm such as an abscess.  However all of his surgery was low down in the abdomen so I really do not have a high degree of suspicion.  He is also completely afebrile and otherwise looks well.  We will try Thorazine for the hiccups.  If that controls that then I would be okay with him going home later today.  Follow-up with me in about a week.

## 2021-06-02 NOTE — PROGRESS NOTES
Overall feels better.  Putting a large amount now out his ostomy.  Afebrile, vital signs stable.  Still a little tachycardic.  Urine output marginal.  Large amount out his NG tube.    Physical exam:  Looks comfortable.  A little diaphoretic.  Abdomen is soft, less distended than yesterday but still a little distended.  Stoma is healthy.  Large amount of stool and air in bag  GEORGINA drain is serous.    Laboratories:  Lab Results   Component Value Date    WBC 7.9 10/05/2019    HGB 10.8 (L) 10/05/2019    HCT 34.2 (L) 10/05/2019    MCV 89 10/05/2019     (H) 10/05/2019     Lab Results   Component Value Date    CREATININE 0.93 10/05/2019    BUN 26 (H) 10/05/2019     10/05/2019    K 4.5 10/05/2019     10/05/2019    CO2 29 10/05/2019     Impression: Postop day #4 difficult sigmoid colectomy for severe diverticulitis.  He is starting to look a little bit better.  His urine output has still been marginal but his creatinine is normal.  I think he might just be now quite a bit fluid up and I am getting challenge him with a little bit of Lasix.  Talked about possibly removing his NG tube but he would rather just keep it down another day rather than risking it going back down.  Hopefully can remove that tomorrow.  Continue to encourage increase activity.

## 2021-06-02 NOTE — PLAN OF CARE
Problem: Potential for Compromised Skin Integrity  Goal: Nutritional status is improving  Outcome: Progressing  Diet advanced to regular this morning. Pt states he ate some chicken noodle soup (but not the noodles), chocolate pudding, and a boost this afternoon. He said maybe chocolate pudding wasn't the best idea, but otherwise states he's feeling okay with this intake.    Problem: Discharge Barriers  Goal: Patient's discharge needs are met  Outcome: Progressing  Potential discharge for today, pt would like to see how dinner goes prior to making a decision. Thorazine seemed to be helpful for hiccups.     Problem: Potential for Falls  Goal: Patient will remain free of falls  Outcome: Progressing  Ambulating independently in hallways.    Problem: Potential for Compromised Skin Integrity  Goal: Skin integrity is maintained or improved  Outcome: Progressing  Ostomy pouch changed with WOCN today. GEORGINA drain and vessel loop drain removed per orders from surgery, tolerated removal well.

## 2021-06-02 NOTE — PLAN OF CARE
Problem: Pain  Goal: Patient's pain/discomfort is manageable  Outcome: Progressing     Problem: Psychosocial Needs  Goal: Demonstrates ability to cope with hospitalization/illness  Outcome: Progressing   Patient is independent after minimal assist with NG. Continues with NG Lis and clear sips. Passing gas and loose brown stool into colostomy.   Requests IV Dilaudid 0.5mg for abdominal and throat pain 6/10. Also new order for Chloraseptic spray.  IV fluids and IV Antibiotics.

## 2021-06-02 NOTE — TELEPHONE ENCOUNTER
----- Message from Susanamarisabel Whitman sent at 10/11/2019 11:18 AM CDT -----  He has been scheduled for 9:15 on the 22nd, but waiting for a confirmation call from him.    Susana  ----- Message -----  From: Tahira Fong MD  Sent: 10/11/2019  10:44 AM CDT  To: General Surgery Scheduling Registration Pool    I believe this patient was set up to see 1 of the SIOBHAN's next week.  Can we change that to have him follow-up with me on the 22nd.  A week from Tuesday.  Thanks.    DSO

## 2021-06-02 NOTE — PROGRESS NOTES
General Surgery Progress Note    8 Days Post-Op    COLECTOMY, SIGMOID, OPEN    Subjective:   Pt is feeling better. No nausea. Hiccups continue. Pain minimal  Tachycardic now. Afebrile.   Output in bag past 24 hours 2300 cc. Urine output adq. Wbc trending upwards. Just went to CT and had 10 cc of thick bloody drainage out.     Vitals:    10/09/19 0016 10/09/19 0440 10/09/19 0723 10/09/19 1133   BP: 117/58 138/70 128/82 132/84   Patient Position: Semi-anna Semi-anna Semi-anna Semi-anna   Pulse: 98 (!) 104 (!) 102 (!) 106   Resp: 16 16 18    Temp: 97.5  F (36.4  C) 98.1  F (36.7  C) 98.7  F (37.1  C) 98.3  F (36.8  C)   TempSrc: Oral Oral Oral Oral   SpO2: 91% 92% 93% 93%   Weight:       Height:           Physical Exam:  Lungs:  CTA  CV:       RRR  Ab:       Soft, + BS, No abdominal pain. Some irritation around staples. But no swelling or erythema suggesting wound infection. Stoma pink. Green liquid stool in bag.     Results from last 7 days   Lab Units 10/09/19  0631   LN-WHITE BLOOD CELL COUNT thou/uL 13.8*   LN-HEMOGLOBIN g/dL 9.9*   LN-HEMATOCRIT % 31.1*   LN-PLATELET COUNT thou/uL 501*       Results from last 7 days   Lab Units 10/09/19  0631 10/08/19  0616   LN-SODIUM mmol/L 139 138   LN-POTASSIUM mmol/L 3.6 3.9   LN-CHLORIDE mmol/L 106 104   LN-CO2 mmol/L 25 24   LN-BLOOD UREA NITROGEN mg/dL 15 21   LN-CREATININE mg/dL 0.62* 0.71   LN-CALCIUM mg/dL 7.5* 7.8*   LN-ALBUMIN g/dL  --  2.4*   LN-PROTEIN TOTAL g/dL  --  5.8*   LN-BILIRUBIN TOTAL mg/dL  --  0.6   LN-ALKALINE PHOSPHATASE U/L  --  51   LN-ALT (SGPT) U/L  --  13   LN-AST (SGOT) U/L  --  12       Assessment:s/p open sigmoidectomy with colostomy                         Post op abscess s/p IR abscess I and D.     Plan: Continue IV abx           Ok clear liquid diet.       Pranav Fernandes PA-C [phone]    [facility]  [address] [place] [id number]

## 2021-06-02 NOTE — PROGRESS NOTES
"Clinical Nutrition Therapy Reassessment Note    Reason for Assessment: follow-up, Pt seen prior to visit walking the hallway.    Current Nutrition Prescription:   Diet: Regular (10/10)  Supplements and Modulars: Boost plus three times a day,    Current Nutrition Intake:  Pt just advanced to regular diet yesterday - he is taking it slowly.  He reports not drinking Boost as regular food is enough right now.  He may want to order later.  Pt knows he needs to drink adequate fluids.    Anthropometrics:  Most recent weight: 215 lb (10/9)  10/9 weight: 215 lb    GI Status/Output:   The patient's GI symptoms include: Flatus per nurse, hiccups per nurse, pt belching  Bowel Sounds hypoactive per nurse  Last BM: 10/11/19, watery, brown  Temporary colostomy: 2250 mL out in last 24 hours    Skin/Wound:  Boom score  20    surgical abdominal incision was noted.    Medications:  Medications reviewed: prilosec, zosyn, PHOS-NAK (2 packets three times a day), kcl    Labs:  Labs reviewed K+ 3.7,  phos 2.8  10/10 Mg 1.7 (received Mg sulfate)    Estimated nutrition needs:  Assessment weight is 98.2 Kg, current weight    Energy needs: 2175-7924 Calories/day, 20-25 [place]/Kg  Protein needs: 115-145 grams/day, 1.2-1.5 g/Kg  Fluid needs: 7955-2251 ml/day, 1 ml/[place]    Physical Findings:  The patient has the following physical signs which could indicate malnutrition: None observed     Malnutrition Assessment:  Noted previously-moderate    Nutrition Risk Level: stable-high risk    Nutrition Dx: inadequate oral/po intake r/t altered GI function as evidenced by clear liquid diet    Goal:   Maintain weight-progressing  Meet estimated nutrition needs - progressing  Diet advance- MET    Intervention:  D/c Boost plus for now  Provided pt with Diet education for Colostomy, gave handout \"Colostomy Nutrition Therapy\",  Pt verbalized understanding.    Monitoring:  Intake, hydration, gi fxn, wt, labs, questions on diet education        See Care Plan for " further Problems, Goals, and Interventions.

## 2021-06-02 NOTE — PROGRESS NOTES
OUTPATIENT OSTOMY ASSESSMENT    Patients mode of arrival: Ambulatory    INTAKE  Type of Stoma: Temporary Colostomy  Anticipated date of takedown: approx 3 months    Diagnosis Pertinent to Stoma:Diverticulitis w/ perforation Date of Diagnosis: 10/1/19  Type of Surgery: Colostomy    Surgery Date: 10/1/19  Surgeon: Ajit     Hospital: [facility]    Purpose of this visit:Checkup:post-op    Present for Teaching Session: Patient   Present: NA    Pertinent Information: Patient presents for post-op follow up.     Current Equipment:    Product # Brand Description   Pouch [address] [place] [address] CTF   Flange      Paste [address] Barrier ring   Powder      Deodorizer                    Pouch Change Frequency: twice weekly  Provider of Care: Emptying: self Pouch Change: self     ASSESSMENT  Stoma Size: Round 1-3/8 inches  Protrusion: 2cm  Vascularity: Pink  Mucocutaneous Juncture: Intact  Peristomal Skin: Intact    Wound: No  Current Wound Care: NA    TREATMENT  NA    INTERVENTIONS  Refitting done  Miscellaneous Interventions: NA    INSTRUCTIONS GIVEN  WOC Role, Anatomy, Pouching Products: Showed pouching system  and Ordering supplies    PLAN  Change in Supplies: See Outpatient Ostomy Instructions      Total Time Spent with Patient: 20 minutes.  Education time: 10 minutes.  Wound care: 0 minutes.

## 2021-06-02 NOTE — PLAN OF CARE
Pt cont w/ nausea/vomiting bile this AM x2. NG tube placed in R nostril per surgeons order. Marked at line 55 at the opening of the nares at this time. Secured w/ dressing. Pt tolerated procedure very well. Portable XR ordered to confirm.

## 2021-06-02 NOTE — PROGRESS NOTES
Events of the last 24 hours noted.  Shortly after I saw the patient he spiked a temperature and then became diaphoretic.  A CT scan of the abdomen was ordered which showed a small pelvic abscess.    T-max 100.7, now afebrile.  Vital signs stable.    Physical exam:  Up walking.  Looks comfortable.    Laboratories:  Lab Results   Component Value Date    WBC 12.8 (H) 10/08/2019    HGB 11.0 (L) 10/08/2019    HCT 34.6 (L) 10/08/2019    MCV 89 10/08/2019     (H) 10/08/2019     Impression: Postop pelvic abscess.  Not surprising given the significant amount of inflammation he had at the time of surgery.  Unfortunate that the drain that a left did not drain it.  I discussed with radiology.  They do not feel they can get a drain into it but do think that they can possibly at least do an aspiration which I think could be beneficial.  If it can at least be aspirated, he can then just keep him on antibiotics and avoid another surgery.  He does not look at all ill so I do not think there is any urgency.  Continue IV antibiotics for now.  Also I would keep him n.p.o.  He still has significant distention of his stomach and small bowel.  I do not think he has a true obstruction.  I think it still just a persistent ileus because of being obstructed going into this.

## 2021-06-02 NOTE — PATIENT INSTRUCTIONS - HE
"OUTPATIENT OSTOMY INSTRUCTIONS    Type of Stoma: Colostomy    Supplier: [employer] [phone]    Equipment:   Product # Brand Description   Pouch [address] 1-3/8\" flat [employer]   Flange      Paste 19570 Coloplast 1-3/8\" thin protective seal   Powder      Deodorizer                    Procedure:  Close the bottom of the pouch.  If needed cut the opening of your pouch to the correct size and then remove backings from adhesive surfaces.  Remove the soiled pouch and discard.  Wash skin with water and dry.  Apply ring: Around stoma  Then: Apply pouching system to stoma site and hold in place for 2-5 minutes.    Pouch change: twice weekly  Wound Care (not under pouching system): NA  _____________________________________________________________________    [facility] Nurse Specialist: Winter Spencer RN CWOCN   Questions: [phone]      I have received a copy of these instructions, have had an opportunity to ask questions, and have had them answered to my satisfaction.    ________________________________________________________________  Patient Signature and Date    Follow-up Appointment: as needed  To schedule an appointment call [facility] Scheduling at [phone]     "

## 2021-06-02 NOTE — PROGRESS NOTES
Kwaku is in for follow-up of a sigmoid colon resection.  This was done first year chronic diverticulitis causing an abscess and obstruction.  A diverting colostomy was necessary.  He is now just a little more than 2 weeks out from surgery.  His surgery was complicated by a pelvic abscess that was aspirated by radiology.  Since then he has been feeling well.  Pain is very well controlled. No fevers. Eating well.    Physical exam:  General: He is moving around well with no signs of discomfort.  Abdomen: Soft minimal tenderness.  The incision(s) is healing up nicely with no signs of infection.  Stoma looks healthy.    Pathology: Chronic diverticulitis.  No evidence of malignancy.    Impression: Doing well postoperatively.  No signs of complications.  Plan: Encouraged  to slowly get back to normal activities.  Told that it would be at least another 2-4 weeks before getting back to normal.  Follow-up with me in about 2 months.  At that point we will set him up for a colonoscopy and then subsequent colostomy takedown.  We will also check a hemoglobin and white count today just to be sure they are normal.    Addendum:  White blood count is 7.1 which is completely normal.

## 2021-06-02 NOTE — PLAN OF CARE
Problem: Pain  Goal: Patient's pain/discomfort is manageable  Outcome: Progressing  PRN dilaudid given x1 for pain. When rounded upon pt has been sleeping.      Problem: Potential for Falls  Goal: Patient will remain free of falls  Outcome: Progressing  Pt is up independently in his room. Call light within reach and used appropriately.     Getting iv zosyn. Pt manages his colostomy independently. Tolerating clears denies any nausea. Will continue to monitor pt status.  Melinda Guerrier RN

## 2021-06-02 NOTE — PROGRESS NOTES
Feels better today.  T-max 99.4, vital signs stable.    Physical exam:  Abdomen is soft, nontender.  Stoma looks good.  Good output.  He just has some generalized induration in the pannus in the lower portion of his incision but overall that looks good.    Laboratories:  Lab Results   Component Value Date    WBC 14.7 (H) 10/11/2019    HGB 9.7 (L) 10/11/2019    HCT 30.6 (L) 10/11/2019    MCV 88 10/11/2019     (H) 10/11/2019     Impression: Postop day #10 sigmoid colectomy for severe acute on chronic diverticulitis with abscess with postop abscess.  They were able to just do an aspirate of some of the fluid 2 days ago.  His white count remains just modestly.  Cultures should come back today.  I am hoping that we could then tailor his antibiotics to the culture results and then hopefully he could be home on oral antibiotics.  He understands he is not out of the woods for needing a repeat operation to drain the fluid but I would like to try to avoid that.

## 2021-06-02 NOTE — PLAN OF CARE
Toradol effective for pain relief, Zofran for nausea somewhat effective. Pt reported passing small amount of gas. NPO but would like to try clears again today.

## 2021-06-02 NOTE — PLAN OF CARE
Problem: Pain  Goal: Patient's pain/discomfort is manageable  Outcome: Progressing     Problem: Nausea  Goal: Nausea will be resolved or  Outcome: Progressing   Fluid aspiration site to right buttocks intact with band aid.  Patient advanced to clear liquid diet.  Had 2 cups of broth and a boost breeze for lunch and jello for dinner.  Patient having some intermittent nausea and abdominal pain after having clears.  No vomiting.  Reglan, thorazine, and dilaudid helpful.  Ambulated in hallway.  Colostomy changed today by WOC.

## 2021-06-02 NOTE — PLAN OF CARE
Problem: Nausea  Goal: Nausea will be resolved or  Outcome: Progressing   No nausea with clear liquids during dinner, just some belching. Colostomy draining watery liquid.       Problem: Pain  Goal: Patient's pain/discomfort is manageable  Outcome: Progressing   Pts. Abdominal and back pain decreased as needed iv dilaudid.     Viola Hinojosa RN

## 2021-06-02 NOTE — PROGRESS NOTES
Clinical Nutrition Therapy Reassessment Note    Reason for Assessment: follow-up    Current Nutrition Prescription:   Diet: Clear liquids  Supplements and Modulars: pt states he's drinking boost breeze twice per day.  He does not want it ordered three times a day     Current Nutrition Intake:  Total nutrient intake from all sources does not meet estimated nutritional needs. Due to clear liquid diet  Pt has been NPO/clear liquid diet: 10/1-10/6 and then was on full liquid and regular diet shortly : 10/6-10/7 and has been NPO or clear liquid from 10/7-10/9. Pt states diet may advance today    Anthropometrics:  Most recent weight: 215 lb (10/9)  10/1 weight: 216 lb    GI Status/Output:   The patient's GI symptoms include: Abdominal pain per nurse  Bowel Sounds present per nurse/ passing flatus  Last BM: 10/10/19    Skin/Wound:  Boom score  19    surgical abdominal incision was noted.    Medications:  Medications reviewed: pantoprazole injection, zosyn, PHOS-NAK (2 packets three times a day)    Labs:  Labs reviewed K+ 3.4, Mg 1.7, phos 2.0    Estimated nutrition needs:  Assessment weight is 98.2 Kg, current weight    Energy needs: 9843-2201 Calories/day, 20-25 [place]/Kg  Protein needs: 115-145 grams/day, 1.2-1.5 g/Kg  Fluid needs: 0228-5247 ml/day, 1 ml/[place]    Physical Findings:  The patient has the following physical signs which could indicate malnutrition: None observed     Malnutrition Assessment:  Noted previously-moderate    Nutrition Risk Level: high risk    Nutrition Dx: inadequate oral/po intake r/t altered GI function as evidenced by clear liquid diet    Goal:   Maintain weight-progressing  Meet estimated nutrition needs-not met  Diet advance-not met    Intervention:  If unable to advance diet in the next 24 hrs would consider nutrition support. RD will follow for possible diet advance    Monitoring:  Diet advance vs need for nutrition support, weight, labs, plan of care        See Care Plan for further  Problems, Goals, and Interventions.

## 2021-06-02 NOTE — PLAN OF CARE
Problem: Daily Care  Goal: Daily care needs are met  10/8/2019 1448 by Zollinger, Nancy K, RN  Outcome: Progressing   Patient remains NPO for possible drain placement tomorrow.  Hasn't had any episodes of vomiting.  Continues on Zofran, Reglan, and compazine for intermittent nausea.  Colostomy putting out watery stool.  C-diff negative.  Ambulating in hallway independently.

## 2021-06-02 NOTE — PLAN OF CARE
Problem: Pain  Goal: Patient's pain/discomfort is manageable  Outcome: Progressing     Problem: Discharge Barriers  Goal: Patient's discharge needs are met  Outcome: Not Progressing     Pt's pain tolerable throughout shift. Up to walk halls x2 & up to chair. Pt c/o heartburn/indigestion after drinking juice. Maalox provided w/ min relief. Later tonight pt vomiting green bile. Zofran not effective. Provided w/ compazine & protonix. IV site WNL.

## 2021-06-02 NOTE — PROCEDURES
[facility]    Procedure: CT guided pelvic collection aspiration with moderate sedation  Date/Time: 10/9/2019 11:21 AM  Performed by: John Contreras MD  Authorized by: John Contreras MD       Universal Protocol    Site marked: Yes    Prior images obtained and reviewed: Yes    Required items: required blood products, implants, devices, and special equipment available    Patient identity confirmed: verbally with patient, provided demographic data and arm band    Reevaluation: Patient was reevaluated immediately before administering moderate or deep sedation or anesthesia    Confirmation checklist: patient's identity using two indicators, relevant allergies, procedure was appropriate and matched the consent or emergent situation and correct equipment/implants were available    Time out: Immediately prior to procedure a time out was called to verify the correct patient, procedure, equipment, support staff and site/side marked as required    Universal Protocol: Joint Commission Universal Protocol was followed    Preparation: Patient was prepped and draped in the usual sterile fashion    ESBL (mL): 0    Anesthesia    Local anesthesia used?: Yes    Local anesthetic: lidocaine 1% without epinephrine    Anesthetic total (mL): 10    Sedation    Patient sedation: Yes    Sedation: fentanyl and midazolam    Vital signs: Vital signs monitored during sedation    Post-procedure    Description of procedure: CT-guided pelvic collection aspiration with moderate sedation. Local anesthetic also used. Patient identification confirm. Time out performed. Site marked and prepped in usual sterile fashion. CT-guided needle placement into pelvic collection. 10 mL of thickened and slightly bloody fluid was aspirated. No complications. No significant blood loss. 25 minutes of continuously monitored sedation time; 1 mg Versed and 50 mcg fentanyl administered in total.    Patient tolerance: Patient tolerated the procedure well  with no immediate complications   Length of time physician present for 1:1 monitoring during sedation: 25

## 2021-06-02 NOTE — PROGRESS NOTES
Progress Note    Assessment/Plan    Principal Problem:    Diverticulitis  Active Problems:    Colon obstruction (H)    Cigarette nicotine dependence without complication    Acute on chronic sigmoid diverticulitis with perforation, colon obstruction status post sigmoid colectomy and colostomy postoperative day 5-pathology shows no malignancy.  NG tube placed on 10/4 due to ileus with nausea and vomiting, removed early this morning, tolerated clears but then when he went to full liquid with cream of wheat started to feel more bloated and burping, Colostomy putting out stool, no melena or hematochezia.  Back off to clear liquid, start Reglan   Continue Zosyn.  Leukocytosis resolved.  Question if he has a intermittent ileus.  Discussed with Dr. Fong.    Acute blood loss anemia-mild.  Recheck.  Decrease likely hemodilution.    Tobacco abuse-counseled quitting.  Patch as needed.    DVT Prophylaxis   Lovenox    Subjective  Patient had NG tube removed last night, was doing better but when he started cream of wheat started to feel more bloating and burping.  Abdominal pain controlled, ambulating, voiding.  No chest pain or shortness of breath.  Having output from his colostomy.  P       Past Medical History  History reviewed. No pertinent past medical history.    Current Medications  Scheduled Meds:    acetaminophen  1,000 mg Oral TID     enoxaparin (LOVENOX) injection  40 mg Subcutaneous Q24H     influenza vaccine (age 50-64 YR or egg allergy) FLUBLOK quad (PF) inj 2019-20  0.5 mL Intramuscular Prior to Discharge     miconazole   Topical BID     omeprazole  20 mg Oral QAM AC     piperacillin-tazobactam  3.375 g Intravenous Q8H     sodium chloride  3 mL Intravenous Line Care     Continuous Infusions:    dextrose 5 % and sodium chloride 0.45 % with KCl 20 mEq/L 75 mL/hr (10/06/19 0900)     PRN Meds:.acetaminophen, aluminum-magnesium hydroxide-simethicone, benzocaine-menthol, calcium (as carbonate), HYDROmorphone, ketorolac,  "metoclopramide, naloxone **OR** naloxone, nicotine, ondansetron **OR** ondansetron, oxyCODONE, phenol-phenolate sodium, prochlorperazine, sodium chloride bacteriostatic  No Known Allergies    Objective  Vital signs in last 24 hours  Temp:  [97.3  F (36.3  C)-98.4  F (36.9  C)] 97.6  F (36.4  C)  Heart Rate:  [] 83  Resp:  [16-17] 16  BP: (126-149)/(69-98) 131/69  Weight:   217 lb (98.4 kg)      Wt Readings from Last 2 Encounters:   10/05/19 1540 217 lb (98.4 kg)   10/04/19 1523 217 lb (98.4 kg)   10/03/19 1954 220 lb 1.6 oz (99.8 kg)   10/02/19 1546 218 lb 14.4 oz (99.3 kg)   10/01/19 2118 216 lb 9.6 oz (98.2 kg)   10/01/19 1354 (!) 250 lb (113.4 kg)   Weight change: 0 lb (0 kg)    Intake/Output last 3 shifts  I/O last 3 completed shifts:  In: 3558.8 [P.O.:1470; I.V.:2038.8; IV Piggyback:50]  Out: 3040 [Urine:875; Emesis/NG output:900; Drains:15; Stool:1250]  Intake/Output this shift:  No intake/output data recorded.    Review of Systems   A 12 point comprehensive review of systems was negative except as noted.    Physical Exam    Temp:  [97.3  F (36.3  C)-98.4  F (36.9  C)] 97.6  F (36.4  C)  Heart Rate:  [] 83  Resp:  [16-17] 16  BP: (126-149)/(69-98) 131/69    /69 (Patient Position: Semi-anna)   Pulse 83   Temp 97.6  F (36.4  C) (Temporal)   Resp 16   Ht 5' 10\" (1.778 m)   Wt 217 lb (98.4 kg)   SpO2 97%   BMI 31.14 kg/m    General appearance: alert, appears stated age and cooperative  Head: Normocephalic, without obvious abnormality, atraumatic  Eyes: Clear conjuctiva  Neck: no adenopathy, no carotid bruit, no JVD, supple, symmetrical, trachea midline and thyroid not enlarged, symmetric, no tenderness/mass/nodules  Lungs: clear to auscultation bilaterally  Heart: regular rate and rhythm and q  Abdomen: Slightly distended, GEORGINA drain putting out serosanguineous red fluid in right lower quadrant, bandaged midline incision, hypoactive bowel sounds  Extremities: Homans sign is negative, no " sign of DVT  Skin: Skin color, texture, turgor normal. No rashes or lesions  Neurologic: Mental status: Alert, oriented, thought content appropriate  Cranial nerves: normal  Sensory: normal  Motor:grossly normal      Pertinent Labs   Lab Results: personally reviewed.   Labs from the previous 24 hours  Recent Results (from the past 24 hour(s))   Basic Metabolic Panel   Result Value Ref Range    Sodium 139 136 - 145 mmol/L    Potassium 4.4 3.5 - 5.0 mmol/L    Chloride 104 98 - 107 mmol/L    CO2 25 22 - 31 mmol/L    Anion Gap, Calculation 10 5 - 18 mmol/L    Glucose 114 70 - 125 mg/dL    Calcium 8.4 (L) 8.5 - 10.5 mg/dL    BUN 24 (H) 8 - 22 mg/dL    Creatinine 0.78 0.70 - 1.30 mg/dL    GFR MDRD Af Amer >60 >60 mL/min/1.73m2    GFR MDRD Non Af Amer >60 >60 mL/min/1.73m2   Platelet Count - every other day x 3   Result Value Ref Range    Platelets 582 (H) 140 - 440 thou/uL     Labs from the previous 7 days:   Results from last 7 days   Lab Units 10/06/19  0618 10/05/19  0631 10/04/19  0614 10/02/19  0357   LN-WHITE BLOOD CELL COUNT thou/uL  --  7.9 6.0 17.8*   LN-HEMOGLOBIN g/dL  --  10.8* 11.8* 13.7*   LN-PLATELET COUNT thou/uL 582* 514* 559*  559* 579*   LN-NEUTROPHILS RELATIVE PERCENT %  --   --   --  91*   LN-MONOCYTES RELATIVE PERCENT %  --   --   --  4     Results from last 7 days   Lab Units 10/06/19  0618 10/05/19  0631 10/04/19  0734 10/02/19  0357 10/01/19  1705   LN-SODIUM mmol/L 139 141 139 136 136   LN-POTASSIUM mmol/L 4.4 4.5 4.8 4.6 4.2   LN-CHLORIDE mmol/L 104 106 102 103 101   LN-CO2 mmol/L 25 29 28 26 27   LN-BLOOD UREA NITROGEN mg/dL 24* 26* 27* 21 18   LN-CREATININE mg/dL 0.78 0.93 0.94 0.88 0.87   LN-CALCIUM mg/dL 8.4* 7.9* 7.9* 8.5 9.1         Results from last 7 days   Lab Units 10/01/19  1705   LN-ALBUMIN g/dL 3.2*   LN-PROTEIN TOTAL g/dL 7.2   LN-BILIRUBIN TOTAL mg/dL 0.5   LN-ALKALINE PHOSPHATASE U/L 71   LN-ALT (SGPT) U/L 16   LN-AST (SGOT) U/L 14           Other labs:   No results found for:  INR, PROTIME    Pertinent Radiology   Personally reviewed radiologists report and impressions  Xr Abdomen Ap Portable    Result Date: 10/4/2019  EXAM: XR ABDOMEN AP PORTABLE LOCATION: [facility] DATE/TIME: 10/4/2019 12:31 PM INDICATION: NG tube placement. COMPARISON: 10/4/2019 11:33 AM abdominal radiograph and CT 10/1/2019.     Nasogastric suction tube has been repositioned with tip now in the mid stomach in good position. Partially visualized persistent dilatation of loops of small and large bowel. Mild gaseous distention of the stomach. Mild platelike atelectasis right lower lung again seen.        Xr Abdomen Ap Portable    Result Date: 10/4/2019  EXAM: XR ABDOMEN AP PORTABLE LOCATION: [facility] DATE/TIME: 10/4/2019 11:41 AM INDICATION: NG tube placement verification COMPARISON: None.     The distal aspect of the nasogastric tube is seen within the midline chest at the level of the aashish. It is difficult to tell with certainty whether or not this lies within the mid esophagus rather than the airway. Gaseous distention of the stomach and visualized portions of the small bowel are noted within the limited portions of the upper abdomen.    Ct Abdomen Pelvis Without Oral With Iv Contrast    Result Date: 10/1/2019  EXAM: CT ABDOMEN PELVIS WO ORAL W IV CONTRAST LOCATION: [facility] DATE/TIME: 10/1/2019 6:39 PM INDICATION: Abd pain, possible hernia. Concerned about mass, firm suprapubic area. COMPARISON: None. TECHNIQUE: Helical enhanced thin-section CT scan of the abdomen and pelvis was performed following injection of IV contrast. Multiplanar reformats were obtained. Dose reduction techniques were used. CONTRAST: Iohexol (Omni) 100 mL. FINDINGS: LUNG BASES: Platelike atelectasis in the right base. Calcified granuloma in the right lower lobe. ABDOMEN: Liver, spleen, pancreas, adrenal glands, and the kidneys are negative. There is distention of the colon down into the pelvis to the level of  the sigmoid where there is a long region of wall thickening. Moderate inflammatory changes within the pelvis surround the sigmoid and there are diverticula present and there may be a small amount of extraluminal air. Findings suggest acute sigmoid diverticulitis with localized perforation and probable early interloop abscess formation measuring 1.6 cm between the anterior lower sigmoid and a loop of sigmoid colon in the pelvis (image 156). There is, however, some prominent shouldering in the wall thickening superiorly in the sigmoid and underlying mass lesion will need to be excluded. There is also some less advanced distention of small bowel. PELVIS: Inflammatory change which extends into the right lower abdomen. MUSCULOSKELETAL: Multilevel lumbar degenerative change.     CONCLUSION: 1.  Colonic obstruction at the level of the sigmoid where there is a long region of wall thickening which does demonstrate some shouldering superiorly and an underlying mass lesion such as carcinoma needs to be excluded. There is, however, large amount of surrounding inflammatory change and a few diverticula present and findings also suggest locally perforated sigmoid diverticulitis. Probable early interloop abscess measuring 1.6 cm with inflammatory reaction extending into the right lower abdomen. Multiple small bowel loops also dilated although to a lesser extent than the colon. NOTE: ABNORMAL REPORT THE DICTATION ABOVE DESCRIBES AN ABNORMALITY FOR WHICH FOLLOW-UP IS NEEDED.           Advanced Care Planning  Discharge Planning : Anticipate discharge in2-3  Factors affecting discharge: Return of bowel  Discussed care with patient, surgery for total time 35 minutes with greater than 50% of total time spent in counseling and coordination of care.    Rickey Palma MD  [facility]    Office: [phone]

## 2021-06-02 NOTE — PROGRESS NOTES
Clinical Nutrition Therapy Reassessment Note    Reason for Assessment: follow-up    Current Nutrition Prescription:   Diet: Regular (just advanced to regular-this am)    Current Nutrition Intake:  Pt consumed 80% of clear liquid meal at lunch on 10/6/19    Anthropometrics:  Most recent weight: 214 lb (10/6)  10/1 weight: 216 lb    GI Status/Output:   The patient's GI symptoms include: Abdominal distention per nurse  Bowel Sounds hypoactive per nurse    Skin/Wound:  Boom score  18    Surgical abdominal incision was noted.  Per WOC note, pt's colostomy is 1-7/8 inches    Medications:  Medications reviewed: prilosec, zosyn    Labs:  Labs reviewed Na 138, glu 112    Physical Findings:  The patient has the following physical signs which could indicate malnutrition: None observed     Malnutrition Assessment:  Noted previously-moderate    Nutrition Risk Level: moderate risk    Nutrition Dx: inadequate oral intake r/t preventing further constipation PTA as evidenced by pt's report.    Goal:   Maintain weight-not met-weight down 2 lbs  Meet estimated nutrition needs-not met yet  Po intake > 75%-progressing  Diet advance-met    Intervention:  Pt had asked for written information on clear and full liquid diet which was provided (he won't need that now)  Follow for need of nutritional supplements    Monitoring:  Po intake/tolerance, weight, labs, need for supplements        See Care Plan for further Problems, Goals, and Interventions.

## 2021-06-02 NOTE — PLAN OF CARE
Goal: Patient s discharge needs are met.  Outcome: Care Progression reviewed with Hospitalist, Care Manager.  Discharge Disposition: Discussed and plan to discharge to: Home  Planned Discharge Date: 10/12/19  Problem: Barriers to discharge include: GI following, diverticulitis  Transportation needs/Ride Time: family transport.

## 2021-06-02 NOTE — PROGRESS NOTES
Progress Note    Assessment/Plan    Principal Problem:    Diverticulitis  Active Problems:    Colon obstruction (H)    Cigarette nicotine dependence without complication    Acute on chronic sigmoid diverticulitis with perforation, colon obstruction status post sigmoid colectomy and colostomy postoperative day 3-pathology shows no malignancy.  Likely ileus, feels much better with NG tube placement.  Okay for clear still according to surgery.  Continue Zosyn.  Cytosis improved    Acute blood loss anemia-mild.  Recheck.    Tobacco abuse-counseled quitting.  Patch as needed.      DVT Prophylaxis   Lovenox    Subjective  Patient feeling much better after NG tube placed.  Was having nausea and vomiting after likely taking too much clear liquid diet.  Not much colostomy output yet.  No chest pain or shortness of breath.    Past Medical History  History reviewed. No pertinent past medical history.    Current Medications  Scheduled Meds:    acetaminophen  1,000 mg Oral TID     docusate sodium  100 mg Oral BID     enoxaparin (LOVENOX) injection  40 mg Subcutaneous Q24H     influenza vaccine (age 50-64 YR or egg allergy) FLUBLOK quad (PF) inj 2019-20  0.5 mL Intramuscular Prior to Discharge     miconazole   Topical BID     pantoprazole  40 mg Intravenous Q24H     piperacillin-tazobactam  3.375 g Intravenous Q8H     sodium chloride  3 mL Intravenous Line Care     Continuous Infusions:    dextrose 5 % and sodium chloride 0.45 % with KCl 20 mEq/L 75 mL/hr (10/04/19 7417)     PRN Meds:.acetaminophen, aluminum-magnesium hydroxide-simethicone, calcium (as carbonate), HYDROmorphone, ketorolac, naloxone **OR** naloxone, ondansetron **OR** ondansetron, oxyCODONE, prochlorperazine, sodium chloride bacteriostatic  No Known Allergies    Objective  Vital signs in last 24 hours  Temp:  [97.7  F (36.5  C)-99.1  F (37.3  C)] 97.9  F (36.6  C)  Heart Rate:  [100-118] 108  Resp:  [18-20] 20  BP: (139-168)/() 139/84  Weight:   217 lb (98.4  "kg)      Wt Readings from Last 2 Encounters:   10/04/19 1523 217 lb (98.4 kg)   10/03/19 1954 220 lb 1.6 oz (99.8 kg)   10/02/19 1546 218 lb 14.4 oz (99.3 kg)   10/01/19 2118 216 lb 9.6 oz (98.2 kg)   10/01/19 1354 (!) 250 lb (113.4 kg)   Weight change: 1 lb 3.2 oz (0.544 kg)    Intake/Output last 3 shifts  I/O last 3 completed shifts:  In: 3551.3 [P.O.:540; I.V.:2806.3; IV Piggyback:205]  Out: 2255 [Urine:300; Emesis/NG output:1900; Drains:50; Stool:5]  Intake/Output this shift:  I/O this shift:  In: 150 [I.V.:150]  Out: 300 [Emesis/NG output:300]    Review of Systems   A 12 point comprehensive review of systems was negative except as noted.    Physical Exam    Temp:  [97.7  F (36.5  C)-99.1  F (37.3  C)] 97.9  F (36.6  C)  Heart Rate:  [100-118] 108  Resp:  [18-20] 20  BP: (139-168)/() 139/84    /84 (Patient Position: Semi-anna)   Pulse (!) 108   Temp 97.9  F (36.6  C) (Oral)   Resp 20   Ht 5' 10\" (1.778 m)   Wt 217 lb (98.4 kg)   SpO2 90%   BMI 31.14 kg/m    General appearance: alert, appears stated age and cooperative  Head: Normocephalic, without obvious abnormality, atraumatic  Eyes: Clear conjuctiva  Neck: no adenopathy, no carotid bruit, no JVD, supple, symmetrical, trachea midline and thyroid not enlarged, symmetric, no tenderness/mass/nodules  Lungs: clear to auscultation bilaterally  Heart: Hypoactive bowel sounds, midline stapled incision, colostomy intact with no significant output  Abdomen: Regular rate and rhythm  Extremities: Homans sign is negative, no sign of DVT  Skin: Skin color, texture, turgor normal. No rashes or lesions  Neurologic: Mental status: Alert, oriented, thought content appropriate  Cranial nerves: normal  Sensory: normal  Motor:grossly normal      Pertinent Labs   Lab Results: personally reviewed.   Labs from the previous 24 hours  Recent Results (from the past 24 hour(s))   Platelet Count - every other day x 3   Result Value Ref Range    Platelets 559 (H) 140 " - 440 thou/uL   HM2(CBC W/O DIFF)   Result Value Ref Range    WBC 6.0 4.0 - 11.0 thou/uL    RBC 4.16 (L) 4.40 - 6.20 mill/uL    Hemoglobin 11.8 (L) 14.0 - 18.0 g/dL    Hematocrit 36.6 (L) 40.0 - 54.0 %    MCV 88 80 - 100 fL    MCH 28.4 27.0 - 34.0 pg    MCHC 32.2 32.0 - 36.0 g/dL    RDW 14.6 (H) 11.0 - 14.5 %    Platelets 559 (H) 140 - 440 thou/uL    MPV 8.2 (L) 8.5 - 12.5 fL   Basic Metabolic Panel   Result Value Ref Range    Sodium 139 136 - 145 mmol/L    Potassium 4.8 3.5 - 5.0 mmol/L    Chloride 102 98 - 107 mmol/L    CO2 28 22 - 31 mmol/L    Anion Gap, Calculation 9 5 - 18 mmol/L    Glucose 132 (H) 70 - 125 mg/dL    Calcium 7.9 (L) 8.5 - 10.5 mg/dL    BUN 27 (H) 8 - 22 mg/dL    Creatinine 0.94 0.70 - 1.30 mg/dL    GFR MDRD Af Amer >60 >60 mL/min/1.73m2    GFR MDRD Non Af Amer >60 >60 mL/min/1.73m2     Labs from the previous 7 days:   Results from last 7 days   Lab Units 10/04/19  0614 10/02/19  0357 10/01/19  1705   LN-WHITE BLOOD CELL COUNT thou/uL 6.0 17.8* 16.2*   LN-HEMOGLOBIN g/dL 11.8* 13.7* 13.1*   LN-PLATELET COUNT thou/uL 559*  559* 579* 532*   LN-NEUTROPHILS RELATIVE PERCENT %  --  91*  --    LN-MONOCYTES RELATIVE PERCENT %  --  4  --      Results from last 7 days   Lab Units 10/04/19  0734 10/02/19  0357 10/01/19  1705   LN-SODIUM mmol/L 139 136 136   LN-POTASSIUM mmol/L 4.8 4.6 4.2   LN-CHLORIDE mmol/L 102 103 101   LN-CO2 mmol/L 28 26 27   LN-BLOOD UREA NITROGEN mg/dL 27* 21 18   LN-CREATININE mg/dL 0.94 0.88 0.87   LN-CALCIUM mg/dL 7.9* 8.5 9.1         Results from last 7 days   Lab Units 10/01/19  1705   LN-ALBUMIN g/dL 3.2*   LN-PROTEIN TOTAL g/dL 7.2   LN-BILIRUBIN TOTAL mg/dL 0.5   LN-ALKALINE PHOSPHATASE U/L 71   LN-ALT (SGPT) U/L 16   LN-AST (SGOT) U/L 14           Other labs:   No results found for: INR, PROTIME    Pertinent Radiology   Personally reviewed radiologists report and impressions  Xr Abdomen Ap Portable    Result Date: 10/4/2019  EXAM: XR ABDOMEN AP PORTABLE [facility]ATION: [facility] DATE/TIME: 10/4/2019 12:31 PM INDICATION: NG tube placement. COMPARISON: 10/4/2019 11:33 AM abdominal radiograph and CT 10/1/2019.     Nasogastric suction tube has been repositioned with tip now in the mid stomach in good position. Partially visualized persistent dilatation of loops of small and large bowel. Mild gaseous distention of the stomach. Mild platelike atelectasis right lower lung again seen.        Xr Abdomen Ap Portable    Result Date: 10/4/2019  EXAM: XR ABDOMEN AP PORTABLE LOCATION: [facility] DATE/TIME: 10/4/2019 11:41 AM INDICATION: NG tube placement verification COMPARISON: None.     The distal aspect of the nasogastric tube is seen within the midline chest at the level of the aashish. It is difficult to tell with certainty whether or not this lies within the mid esophagus rather than the airway. Gaseous distention of the stomach and visualized portions of the small bowel are noted within the limited portions of the upper abdomen.    Ct Abdomen Pelvis Without Oral With Iv Contrast    Result Date: 10/1/2019  EXAM: CT ABDOMEN PELVIS WO ORAL W IV CONTRAST LOCATION: [facility] DATE/TIME: 10/1/2019 6:39 PM INDICATION: Abd pain, possible hernia. Concerned about mass, firm suprapubic area. COMPARISON: None. TECHNIQUE: Helical enhanced thin-section CT scan of the abdomen and pelvis was performed following injection of IV contrast. Multiplanar reformats were obtained. Dose reduction techniques were used. CONTRAST: Iohexol (Omni) 100 mL. FINDINGS: LUNG BASES: Platelike atelectasis in the right base. Calcified granuloma in the right lower lobe. ABDOMEN: Liver, spleen, pancreas, adrenal glands, and the kidneys are negative. There is distention of the colon down into the pelvis to the level of the sigmoid where there is a long region of wall thickening. Moderate inflammatory changes within the pelvis surround the sigmoid and there are diverticula present and there may be a small  amount of extraluminal air. Findings suggest acute sigmoid diverticulitis with localized perforation and probable early interloop abscess formation measuring 1.6 cm between the anterior lower sigmoid and a loop of sigmoid colon in the pelvis (image 156). There is, however, some prominent shouldering in the wall thickening superiorly in the sigmoid and underlying mass lesion will need to be excluded. There is also some less advanced distention of small bowel. PELVIS: Inflammatory change which extends into the right lower abdomen. MUSCULOSKELETAL: Multilevel lumbar degenerative change.     CONCLUSION: 1.  Colonic obstruction at the level of the sigmoid where there is a long region of wall thickening which does demonstrate some shouldering superiorly and an underlying mass lesion such as carcinoma needs to be excluded. There is, however, large amount of surrounding inflammatory change and a few diverticula present and findings also suggest locally perforated sigmoid diverticulitis. Probable early interloop abscess measuring 1.6 cm with inflammatory reaction extending into the right lower abdomen. Multiple small bowel loops also dilated although to a lesser extent than the colon. NOTE: ABNORMAL REPORT THE DICTATION ABOVE DESCRIBES AN ABNORMALITY FOR WHICH FOLLOW-UP IS NEEDED.           Advanced Care Planning  Discharge Planning : Anticipate discharge in several days  Factors affecting discharge: Option of bowel  Discussed care with patient, surgery for total time 35 minutes with greater than 50% of total time spent in counseling and coordination of care.    Rickey Palma MD  [facility] Hospitalist    Office: [phone]

## 2021-06-02 NOTE — PROGRESS NOTES
[facility] Medicine Service Progress Note    Assessment/Plan    53-year-old male, ,  previously healthy with a history of tobacco abuse admitted for abdominal pain (x one month prior to admission) secondary to acute on chronic sigmoid diverticulitis with perforation.     Severe acute on chronic sigmoid diverticulitis with perforation, colon obstruction status post sigmoid colectomy and colostomy postoperative day 9  -pathology negative for malignancy.    -NG tube placed on 10/4 due to ileus with nausea and vomiting, removed on 10/6.    -Started to have fever and tachycardia with increased WBC on 10/7, CT showed new small pelvic abscess even though he postoperatively had a GEORGINA drain placed was removed a few days ago.  Lactic acid normal.  Abscess too small to place a drain but was amenable to percutaneous ct-guided aspiration.  Gram stain as below, culture pending.  --  Continuing Zosyn pending gram stain/culture from aspiration two days ago.  E coli and bacteroides noted on   --  WBC 13.8-->14.9 today    - N.p.o., probably has slowly resolving ileus rather than a true small bowel obstruction noted on CT.  Surgery following and appreciated.  No further emesis  But not great PO intake as far as volume; pt notes minimal appetite. Advancing to full liquid today  - C. difficile negative  --repeat cbc in AM     Protein calorie malnutrition, moderate  Hypomagnesemia  hypokalemia  - really no nutritive intake since surgery d/t ileus until starting clears 10/9  - albumin 10/8 2.4  -  If setback w oral intake, ? May need tpn?  - monitor for refeeding syndrome, phos, K and Mg replacement ordered.  - now on full liquids    Acute blood loss anemia-mild.    Hemoglobin 10-11; stable.     Intermittent dyspnea- improved  -has mild chronic cough, sometimes feels slightly short of breath that resolves immediately, 3 times a day.   - CT showed no acute infiltrate or pulmonary embolism, primarily atelectasis.     -Encourage incentive and spirometry.  No dyspnea today.     CODY  - stop protonix, change to PO omeprazole.    Hiccups-surgery trying Thorazine as needed.  Improved.     Tobacco abuse-counseled quitting.  Patch as needed.     Code status:full  VTE prophylaxis: lovenox      Barriers to discharge: clinical improvement, need for IV abx (defer to surg on transition to PO abx regimen given recent abscess drainage; likely need sensitivities to guide PO abx choices.)    Days to discharge: ?1    Subjective  stooling into ostomy, some gas.  abd doesn't feel as distended per patient  Tolerating a couple jello, a cup of broth, and felt very bloated overnight.    This am, minimal appetite.  Urinating normally.   Notes no CP or dyspnea.  Last vomited 10/7 after consuming oral contrast.    Objective    Vital signs in last 24 hours  Temp:  [97.5  F (36.4  C)-99.6  F (37.6  C)] 98  F (36.7  C)  Heart Rate:  [] 102  Resp:  [14-20] 18  BP: (124-136)/(78-87) 131/80  SpO2: 93 %  Weight:   215 lb 1.6 oz (97.6 kg)      Intake/Output last 3 shifts  I/O last 3 completed shifts:  In: 3081 [P.O.:1740; I.V.:1191; IV Piggyback:150]  Out: 1575 [Urine:975; Stool:600]  Intake/Output this shift:  No intake/output data recorded.    Review of Systems   12 pt ROS performed and negative except as noted above.    Physical Exam    General appearance: alert, appears older than stated age, cooperative and no distress  Head: Normocephalic, without obvious abnormality, atraumatic  Throat: OP pink, moist.  Neck: no adenopathy, no JVD, supple, symmetrical, trachea midline and thyroid not enlarged, symmetric, no tenderness/mass/nodules  Lungs: clear to auscultation bilaterally and no crackles or wheezes.  Heart: regular rate and rhythm, S1, S2 normal, no murmur, click, rub or gallop  Abdomen: active bowel sounds.  Colostomy bud pink; liquid greenish stool in ostomy bag.  Staples over midline incision.    Extremities: extremities normal, atraumatic, no  cyanosis or edema  Pulses: 2+ and symmetric  Skin: Skin color, texture, turgor normal. No rashes or lesions  Neurologic: Grossly normal      Pertinent Labs   Lab Results: personally reviewed.       Recent Results (from the past 24 hour(s))   Culture/Gram Stain: Body Fluid    Collection Time: 10/09/19 11:00 AM   Result Value Ref Range    Gram Stain Result 4+ Polymorphonuclear leukocytes     Gram Stain Result 3+ Gram positive bacilli     Gram Stain Result 2+ Gram negative bacilli     Gram Stain Result 1+ Gram positive cocci in pairs    Potassium    Collection Time: 10/09/19  5:46 PM   Result Value Ref Range    Potassium 3.9 3.5 - 5.0 mmol/L   Phosphorus    Collection Time: 10/09/19  5:46 PM   Result Value Ref Range    Phosphorus 1.6 (L) 2.5 - 4.5 mg/dL   Lactic Acid    Collection Time: 10/09/19  9:41 PM   Result Value Ref Range    Lactic Acid 0.8 0.5 - 2.2 mmol/L   Phosphorus    Collection Time: 10/10/19  6:18 AM   Result Value Ref Range    Phosphorus 2.0 (L) 2.5 - 4.5 mg/dL   Magnesium    Collection Time: 10/10/19  6:18 AM   Result Value Ref Range    Magnesium 1.7 (L) 1.8 - 2.6 mg/dL   Potassium - Next AM    Collection Time: 10/10/19  6:18 AM   Result Value Ref Range    Potassium 3.4 (L) 3.5 - 5.0 mmol/L   HM2(CBC W/O DIFF)    Collection Time: 10/10/19  6:18 AM   Result Value Ref Range    WBC 14.9 (H) 4.0 - 11.0 thou/uL    RBC 3.87 (L) 4.40 - 6.20 mill/uL    Hemoglobin 10.7 (L) 14.0 - 18.0 g/dL    Hematocrit 34.2 (L) 40.0 - 54.0 %    MCV 88 80 - 100 fL    MCH 27.6 27.0 - 34.0 pg    MCHC 31.3 (L) 32.0 - 36.0 g/dL    RDW 15.2 (H) 11.0 - 14.5 %    Platelets 568 (H) 140 - 440 thou/uL    MPV 8.5 8.5 - 12.5 fL                Shiloh Mclean MD  [facility] Medicine Service  [phone]  Pager [phone]

## 2021-06-02 NOTE — PROGRESS NOTES
[facility] Medicine Service Progress Note    Assessment/Plan    53-year-old male, ,  previously healthy with a history of tobacco abuse admitted for abdominal pain (x one month prior to admission) secondary to acute on chronic sigmoid diverticulitis with perforation.     Acute on chronic sigmoid diverticulitis with perforation, colon obstruction status post sigmoid colectomy and colostomy postoperative day 8  -pathology negative for malignancy.    -NG tube placed on 10/4 due to ileus with nausea and vomiting, removed on 10/6.    -Started to have fever and tachycardia with increased WBC on 10/7, CT showed new small pelvic abscess even though he postoperatively had a GEORGINA drain placed was removed a few days ago.  Lactic acid normal.  Abscess too small to place a drain but was amenable to percutaneous ct-guided aspiration.  Gram stain as below, culture pending.  --  Continuing Zosyn per cultures.  --  WBC improved.    - N.p.o., probably has a ileus rather than a true small bowel obstruction noted on CT.  Surgery continues to follow.  No further emesis today.   - C. difficile negative     Protein calorie malnutrition, moderate  - really no nutritive intake since surgery d/t ileus  - albumin yesterday 2.4  - starting clears, monitor.  If setback w oral intake, ? May need tpn?  - monitor for refeeding.    Acute blood loss anemia-mild.  Recheck.  Decrease likely hemodilution.     Intermittent dyspnea- improved  -has mild chronic cough, sometimes feels slightly short of breath that resolves immediately, 3 times a day.   - CT showed no acute infiltrate or pulmonary embolism, primarily atelectasis.    -Encourage incentive and spirometry.  No dyspnea today.     Hiccups-surgery trying Thorazine as needed.  Improved.     Tobacco abuse-counseled quitting.  Patch as needed.     Code status:full  VTE prophylaxis: lovenox     Barriers to discharge: clinical improvement, need for IV abx (defer to surg on  transition to PO abx regimen)    Days to discharge: ?2    Subjective  Continues with hiccups but they are tolerable.  Would like to try to eat.  Liquid stool in colostomy bag.  Notes abd pain tolerable.  Completed drain of pelvic abscess today.  Urinating normally.   Notes no CP or dyspnea.  Last vomited yesterday after consuming oral contrast.    Objective    Vital signs in last 24 hours  Temp:  [97.5  F (36.4  C)-99.6  F (37.6  C)] 99.5  F (37.5  C)  Heart Rate:  [] 106  Resp:  [14-18] 18  BP: (117-138)/(58-86) 131/78  SpO2: 95 %  Weight:   215 lb 1.6 oz (97.6 kg)      Intake/Output last 3 shifts  I/O last 3 completed shifts:  In: 1608 [P.O.:600; I.V.:843; IV Piggyback:165]  Out: 2175 [Urine:1425; Stool:750]  Intake/Output this shift:  I/O this shift:  In: 240 [P.O.:240]  Out: -     Review of Systems   12 pt ROS performed and negative except as noted above.    Physical Exam    General appearance: alert, appears older than stated age, cooperative and no distress  Head: Normocephalic, without obvious abnormality, atraumatic  Eyes: negative  Throat: OP pink, moist.  Neck: no adenopathy, no JVD, supple, symmetrical, trachea midline and thyroid not enlarged, symmetric, no tenderness/mass/nodules  Lungs: clear to auscultation bilaterally and no crackles or wheezes.  Heart: regular rate and rhythm, S1, S2 normal, no murmur, click, rub or gallop  Abdomen: active bowel sounds.  Colostomy bud pink; liquid greenish stool in ostomy bag.  Staples over midline incision.    Extremities: extremities normal, atraumatic, no cyanosis or edema  Pulses: 2+ and symmetric  Skin: Skin color, texture, turgor normal. No rashes or lesions  Neurologic: Grossly normal      Pertinent Labs   Lab Results: personally reviewed.       Recent Results (from the past 24 hour(s))   Basic Metabolic Panel    Collection Time: 10/09/19  6:31 AM   Result Value Ref Range    Sodium 139 136 - 145 mmol/L    Potassium 3.6 3.5 - 5.0 mmol/L    Chloride 106  98 - 107 mmol/L    CO2 25 22 - 31 mmol/L    Anion Gap, Calculation 8 5 - 18 mmol/L    Glucose 126 (H) 70 - 125 mg/dL    Calcium 7.5 (L) 8.5 - 10.5 mg/dL    BUN 15 8 - 22 mg/dL    Creatinine 0.62 (L) 0.70 - 1.30 mg/dL    GFR MDRD Af Amer >60 >60 mL/min/1.73m2    GFR MDRD Non Af Amer >60 >60 mL/min/1.73m2   HM2(CBC w/o Differential)    Collection Time: 10/09/19  6:31 AM   Result Value Ref Range    WBC 13.8 (H) 4.0 - 11.0 thou/uL    RBC 3.54 (L) 4.40 - 6.20 mill/uL    Hemoglobin 9.9 (L) 14.0 - 18.0 g/dL    Hematocrit 31.1 (L) 40.0 - 54.0 %    MCV 88 80 - 100 fL    MCH 28.0 27.0 - 34.0 pg    MCHC 31.8 (L) 32.0 - 36.0 g/dL    RDW 15.0 (H) 11.0 - 14.5 %    Platelets 501 (H) 140 - 440 thou/uL    MPV 8.2 (L) 8.5 - 12.5 fL   Magnesium    Collection Time: 10/09/19  6:31 AM   Result Value Ref Range    Magnesium 2.0 1.8 - 2.6 mg/dL   Culture/Gram Stain: Body Fluid    Collection Time: 10/09/19 11:00 AM   Result Value Ref Range    Gram Stain Result 4+ Polymorphonuclear leukocytes     Gram Stain Result 3+ Gram positive bacilli     Gram Stain Result 2+ Gram negative bacilli     Gram Stain Result 1+ Gram positive cocci in pairs        Pertinent Radiology/EKG/Tele  New Radiology Results: Results/reports reviewed     EXAM:  1. PELVIC COLLECTION ASPIRATION  2. CT GUIDANCE  3. CONSCIOUS SEDATION  LOCATION: [facility]  DATE/TIME: 10/9/2019 11:21 AM     INDICATION: Pelvic abscess, Aspiration alone OK.     PROCEDURE: Informed consent obtained. Site marked. Prior images reviewed. Required items made available. Patient identity confirmed verbally and with arm band. Patient reevaluated immediately before administering sedation. Universal protocol was   followed. Time out performed. The site was prepped and draped in sterile fashion. 10 mL of 1% lidocaine was infused into the local soft tissues. Dose reduction techniques were used. Preliminary CT obtained. Site marked. Needle advanced into the pelvic   collection under CT guidance. 10  mL thick and reddish / bloody fluid was aspirated. No significant blood loss.     The patient tolerated the procedure well. No immediate complications.     SEDATION: Versed 1 mg. Fentanyl 50 mcg. The procedure was performed with administration intravenous conscious sedation with appropriate preoperative, intraoperative, and postoperative evaluation.     25 minutes of supervised face to face conscious sedation time was provided by a radiology nurse under my direct supervision.     IMPRESSION:   CONCLUSION:  1.  Successful aspiration pelvic collection.          Shiloh Mclean MD  [facility] Medicine Service  [phone]  Pager [phone]

## 2021-06-02 NOTE — PROGRESS NOTES
"WOC stoma assessment Colostomy    Allergies:  No Known Allergies    Height:  5' 10\" (1.778 m)    Weight:   214 lb 8 oz (97.3 kg)    Past Medical History:  History reviewed. No pertinent past medical history.    Labs:  Recent Labs     10/08/19  0616 10/09/19  0631   HGB 11.0* 9.9*   ALBUMIN 2.4*  --        Boom:  Boom Scale Score: 18    Specialty Bed:       INTAKE  Type of Stoma: Temporary Colostomy  Anticipated date of takedown: unknown  Diagnosis Pertinent to Stoma:Diverticulitis w/ perforation Date of Diagnosis: 10/1/19  Type of Surgery: Colostomy Surgery Date: 10/1/19  Surgeon: Ajit   Supplies: Pouch  [place] #[id number] -Flat 1 piece cut to fit fecal pouch and Paste  [place] #[id number] -Adapt Ring.    ASSESSMENT  Colostomy   Stoma Size: Round 1-3/4 inches  Protrusion: 2cm  Vascularity:95% Pink, 5% Sloughing along base at 9 o'clock  Mucocutaneous Juncture: intact  Peristomal Skin: intact  Patient changed pouch with minimal assist     TREATMENT/EQUIPMENT  NA    Supplies: Pouch  [place] #[id number] -Flat 1 piece cut to fit fecal pouch and Paste  [place] #[id number] -Adapt Ring    Instructions Given: [facility] Role, Activity, Anatomy, Bathing: Ostomy supplies are waterproof., Clothing, Diet, Exercise, Fluids: Drink 6-8 glasses of fluids daily , Home Care: if needed, Pouching Products: Showed pouching system , clinic follow up and Ordering supplies    Nursing care provided was coordinated care with RN    Discussed plan of care with nurse and patient.     Outcomes and treatment recommendations are to To contain output, To be knowledgable with pouch change/emptying before discharge and To be independant with pouch change/emptying before discharge    Actions taken by [facility] RN: 15 minutes of education.    Planned Follow Up: Later this week.    Plan for next visit: Reassess stoma/peristomal skin and Patient to change pouch with assist    "

## 2021-06-02 NOTE — PLAN OF CARE
VSS. Throat painful from NG. Dilaudid given. NG clamped. Tolerating clears. Colostomy putting out loose soft brown stool.   Pt starting to manage. Dressing to abdominal incision. Georgina dressing changed.   Small drainage. Small output so far in GEORGINA.  Ambulating. New iv in rt hand. IV fluids and iv antibxs infusing.  Care plan reviewed.   Problem: Pain  Goal: Patient's pain/discomfort is manageable  Outcome: Progressing     Problem: Safety  Goal: Patient will be injury free during hospitalization  Outcome: Progressing     Problem: Daily Care  Goal: Daily care needs are met  Outcome: Progressing     Problem: Psychosocial Needs  Goal: Demonstrates ability to cope with hospitalization/illness  Outcome: Progressing  Goal: Collaborate with patient/family/caregiver to identify patient specific goals for this hospitalization  Outcome: Progressing     Problem: Discharge Barriers  Goal: Patient's discharge needs are met  Outcome: Progressing     Problem: Knowledge Deficit  Goal: Patient/family/caregiver demonstrates understanding of disease process, treatment plan, medications, and discharge instructions  Outcome: Progressing     Problem: Potential for Falls  Goal: Patient will remain free of falls  Outcome: Progressing     Problem: Potential for Compromised Skin Integrity  Goal: Skin integrity is maintained or improved  Outcome: Progressing  Goal: Nutritional status is improving  Outcome: Progressing     Problem: Urinary Incontinence  Goal: Perineal skin integrity is maintained or improved  Outcome: Progressing     Problem: Risk for Fluid Volume Deficit  Goal: Deficient fluid volume related to excessive extracellular fluid losses or decreased fluid intake  Outcome: Progressing     Problem: Fluid volume excess  Goal: Excess fluid volume related to increased water and/or sodium retention  Outcome: Progressing

## 2021-06-02 NOTE — PROGRESS NOTES
Progress Note    Assessment/Plan    Principal Problem:    Diverticulitis  Active Problems:    Colon obstruction (H)    Cigarette nicotine dependence without complication    Abscess of male pelvis (H)    53-year-old male previously healthy with a history of tobacco abuse admitted for abdominal pain secondary to acute on chronic sigmoid diverticulitis with perforation.    Acute on chronic sigmoid diverticulitis with perforation, colon obstruction status post sigmoid colectomy and colostomy postoperative day 7-pathology shows no malignancy.  NG tube placed on 10/4 due to ileus with nausea and vomiting, removed on 10/6.  Started to have fever and tachycardia with increased WBC on 10/7, CT showed new small pelvic abscess even though he postoperatively had a GEORGINA drain placed was removed a few days ago.  Lactic acid normal.  Abscess too small to place a drain but may be possible to do a percutaneous drainage, now planned for tomorrow.  Continuing Zosyn, tachycardia improved, WBC improved.  N.p.o., probably has a ileus rather than a true small bowel obstruction noted on CT.  Surgery continues to follow.  No further emesis today.  N.p.o.  C. difficile negative    Acute blood loss anemia-mild.  Recheck.  Decrease likely hemodilution.    Intermittent dyspnea-has mild chronic cough, sometimes feels slightly short of breath that resolves immediately, 3 times a day.  CT shows no acute infiltrate or pulmonary embolism, primarily atelectasis.  Encourage incentive and spirometry.  No dyspnea today.    Hiccups-surgery trying Thorazine as needed.  Improved.    Tobacco abuse-counseled quitting.  Patch as needed.    DVT Prophylaxis   Lovenox    Subjective  Feeling better today.  Still feels a little bloated, having watery output from his colostomy.  No chest pain or shortness of breath.  Febrile this morning but resolved.  Had emesis yesterday after taking oral contrast but none today.  .  Voiding with no dysuria.       Past Medical  "History  History reviewed. No pertinent past medical history.    Current Medications  Scheduled Meds:    acetaminophen  1,000 mg Oral TID     miconazole   Topical BID     pantoprazole  40 mg Intravenous Q24H     piperacillin-tazobactam  3.375 g Intravenous Q8H     sodium chloride  3 mL Intravenous Line Care     Continuous Infusions:    dextrose 5%-NaCl 0.9%       PRN Meds:.acetaminophen, aluminum-magnesium hydroxide-simethicone, benzocaine-menthol, calcium (as carbonate), chlorproMAZINE, HYDROmorphone, melatonin, metoclopramide, naloxone **OR** naloxone, nicotine, ondansetron **OR** ondansetron, oxyCODONE, phenol-phenolate sodium, prochlorperazine, sodium chloride bacteriostatic  No Known Allergies    Objective  Vital signs in last 24 hours  Temp:  [97.8  F (36.6  C)-98.9  F (37.2  C)] 98.9  F (37.2  C)  Heart Rate:  [104-119] 104  Resp:  [18] 18  BP: (119-137)/(70-82) 119/70  Weight:   214 lb 8 oz (97.3 kg)      Wt Readings from Last 2 Encounters:   10/06/19 1751 214 lb 8 oz (97.3 kg)   10/05/19 1540 217 lb (98.4 kg)   10/04/19 1523 217 lb (98.4 kg)   10/03/19 1954 220 lb 1.6 oz (99.8 kg)   10/02/19 1546 218 lb 14.4 oz (99.3 kg)   10/01/19 2118 216 lb 9.6 oz (98.2 kg)   10/01/19 1354 (!) 250 lb (113.4 kg)   Weight change:     Intake/Output last 3 shifts  I/O last 3 completed shifts:  In: 2556.8 [I.V.:2479.3; IV Piggyback:77.5]  Out: 2750 [Urine:1075; Stool:1675]  Intake/Output this shift:  I/O this shift:  In: 77 [IV Piggyback:77]  Out: 600 [Urine:600]    Review of Systems   A 12 point comprehensive review of systems was negative except as noted.    Physical Exam    Temp:  [97.8  F (36.6  C)-98.9  F (37.2  C)] 98.9  F (37.2  C)  Heart Rate:  [104-119] 104  Resp:  [18] 18  BP: (119-137)/(70-82) 119/70    /70 (Patient Position: Semi-anna)   Pulse (!) 104   Temp 98.9  F (37.2  C) (Oral)   Resp 18   Ht 5' 10\" (1.778 m)   Wt 214 lb 8 oz (97.3 kg)   SpO2 94%   BMI 30.78 kg/m    General appearance: alert, " appears stated age and cooperative  Head: Normocephalic, without obvious abnormality, atraumatic  Eyes: Clear conjuctiva  Neck: no adenopathy, no carotid bruit, no JVD, supple, symmetrical, trachea midline and thyroid not enlarged, symmetric, no tenderness/mass/nodules  Lungs: clear to auscultation bilaterally  Heart: regular rate and rhythm and q  Abdomen: Slightly distended, GEORGINA drain putting out serosanguineous red fluid in right lower quadrant, bandaged midline incision, hypoactive bowel sounds colostomy putting out brown liquid  Extremities: Homans sign is negative, no sign of DVT  Skin: Skin color, texture, turgor normal. No rashes or lesions  Neurologic: Mental status: Alert, oriented, thought content appropriate  Cranial nerves: normal  Sensory: normal  Motor:grossly normal      Pertinent Labs   Lab Results: personally reviewed.   Labs from the previous 24 hours  Recent Results (from the past 24 hour(s))   Comprehensive Metabolic Panel   Result Value Ref Range    Sodium 138 136 - 145 mmol/L    Potassium 3.9 3.5 - 5.0 mmol/L    Chloride 104 98 - 107 mmol/L    CO2 24 22 - 31 mmol/L    Anion Gap, Calculation 10 5 - 18 mmol/L    Glucose 112 70 - 125 mg/dL    BUN 21 8 - 22 mg/dL    Creatinine 0.71 0.70 - 1.30 mg/dL    GFR MDRD Af Amer >60 >60 mL/min/1.73m2    GFR MDRD Non Af Amer >60 >60 mL/min/1.73m2    Bilirubin, Total 0.6 0.0 - 1.0 mg/dL    Calcium 7.8 (L) 8.5 - 10.5 mg/dL    Protein, Total 5.8 (L) 6.0 - 8.0 g/dL    Albumin 2.4 (L) 3.5 - 5.0 g/dL    Alkaline Phosphatase 51 45 - 120 U/L    AST 12 0 - 40 U/L    ALT 13 0 - 45 U/L   HM2(CBC w/o Differential)   Result Value Ref Range    WBC 12.8 (H) 4.0 - 11.0 thou/uL    RBC 3.91 (L) 4.40 - 6.20 mill/uL    Hemoglobin 11.0 (L) 14.0 - 18.0 g/dL    Hematocrit 34.6 (L) 40.0 - 54.0 %    MCV 89 80 - 100 fL    MCH 28.1 27.0 - 34.0 pg    MCHC 31.8 (L) 32.0 - 36.0 g/dL    RDW 14.8 (H) 11.0 - 14.5 %    Platelets 577 (H) 140 - 440 thou/uL    MPV 8.3 (L) 8.5 - 12.5 fL    Magnesium   Result Value Ref Range    Magnesium 1.9 1.8 - 2.6 mg/dL   Protime-INR   Result Value Ref Range    INR 1.36 (H) 0.90 - 1.10     Labs from the previous 7 days:   Results from last 7 days   Lab Units 10/08/19  0616 10/07/19  1554 10/07/19  0609  10/02/19  0357   LN-WHITE BLOOD CELL COUNT thou/uL 12.8* 15.5* 11.7*   < > 17.8*   LN-HEMOGLOBIN g/dL 11.0* 11.8* 12.1*   < > 13.7*   LN-PLATELET COUNT thou/uL 577* 594* 616*   < > 579*   LN-NEUTROPHILS RELATIVE PERCENT %  --  89*  --   --  91*   LN-MONOCYTES RELATIVE PERCENT %  --  4  --   --  4    < > = values in this interval not displayed.     Results from last 7 days   Lab Units 10/08/19  0616 10/07/19  0609 10/06/19  0618 10/05/19  0631 10/04/19  0734 10/02/19  0357   LN-SODIUM mmol/L 138 138 139 141 139 136   LN-POTASSIUM mmol/L 3.9 4.2 4.4 4.5 4.8 4.6   LN-CHLORIDE mmol/L 104 104 104 106 102 103   LN-CO2 mmol/L 24 24 25 29 28 26   LN-BLOOD UREA NITROGEN mg/dL 21 26* 24* 26* 27* 21   LN-CREATININE mg/dL 0.71 0.81 0.78 0.93 0.94 0.88   LN-CALCIUM mg/dL 7.8* 8.3* 8.4* 7.9* 7.9* 8.5     Results from last 7 days   Lab Units 10/08/19  0616   LN-INR  1.36*     Results from last 7 days   Lab Units 10/08/19  0616   LN-ALBUMIN g/dL 2.4*   LN-PROTEIN TOTAL g/dL 5.8*   LN-BILIRUBIN TOTAL mg/dL 0.6   LN-ALKALINE PHOSPHATASE U/L 51   LN-ALT (SGPT) U/L 13   LN-AST (SGOT) U/L 12           Other labs:   Lab Results   Component Value Date    INR 1.36 (H) 10/08/2019       Pertinent Radiology   Personally reviewed radiologists report and impressions  Xr Chest 1 View Portable    Result Date: 10/7/2019  EXAM: XR CHEST 1 VIEW PORTABLE LOCATION: [facility] DATE/TIME: 10/7/2019 4:04 PM INDICATION: cough COMPARISON: None.     There is probable atelectasis at the right medial lung base with elevation of the right hemidiaphragm. The left lung is clear. There is no pneumothorax or pleural effusion. The heart size and pulmonary vascularity are normal.    Xr Abdomen Ap  Portable    Result Date: 10/4/2019  EXAM: XR ABDOMEN AP PORTABLE LOCATION: [facility] DATE/TIME: 10/4/2019 12:31 PM INDICATION: NG tube placement. COMPARISON: 10/4/2019 11:33 AM abdominal radiograph and CT 10/1/2019.     Nasogastric suction tube has been repositioned with tip now in the mid stomach in good position. Partially visualized persistent dilatation of loops of small and large bowel. Mild gaseous distention of the stomach. Mild platelike atelectasis right lower lung again seen.        Xr Abdomen Ap Portable    Result Date: 10/4/2019  EXAM: XR ABDOMEN AP PORTABLE LOCATION: [facility] DATE/TIME: 10/4/2019 11:41 AM INDICATION: NG tube placement verification COMPARISON: None.     The distal aspect of the nasogastric tube is seen within the midline chest at the level of the aashish. It is difficult to tell with certainty whether or not this lies within the mid esophagus rather than the airway. Gaseous distention of the stomach and visualized portions of the small bowel are noted within the limited portions of the upper abdomen.    Cta Chest Pe Run    Result Date: 10/7/2019  EXAM: CTA CHEST PE RUN LOCATION: [facility] DATE/TIME: 10/7/2019 6:40 PM INDICATION: Shortness of breath dyspnea, fever COMPARISON: CT abdomen and pelvis 10/1/2019 TECHNIQUE: Helical acquisition through the chest was performed during the arterial phase of contrast enhancement using IV contrast. 2D and 3D reconstructions were performed by the CT technologist. Dose reduction techniques were used. IV CONTRAST: 100 cc Omnipaque 350 FINDINGS: ANGIOGRAM CHEST: The exam is limited due to respiratory motion and streak artifact from the dense contrast bolus. No central or definite peripheral pulmonary artery filling defect. Normal caliber thoracic aorta without evidence of a dissection. RV/LV RATIO: N/A LUNGS AND PLEURA: The central airways are clear. Right lower lobar subsegmental atelectasis. Left lower lobe atelectasis  versus scarring. Mild mosaic lung attenuation suggesting mild pulmonary air trapping. Scattered calcified granulomas. No pulmonary mass or suspicious nodule. MEDIASTINUM: No thoracic adenopathy. Calcified mediastinal and hilar lymph nodes. Normal heart size and no pericardial effusion. Circumferential mural thickening of the lower thoracic esophagus. LIMITED UPPER ABDOMEN: Findings suspicious for portal venous gas. The visualized segments of proximal small bowel are mildly dilated. MUSCULOSKELETAL: Negative.     CONCLUSION: 1.  Limited exam due to respiratory motion and streak artifact. No evidence of a central or definite peripheral pulmonary artery embolism. No thoracic aortic dissection. 2.  Bilateral lower lobar atelectasis and scarring. 3.  Proximal small bowel dilatation and findings suspicious for portal venous gas. Please see the concurrent CT abdomen and pelvis report.    Ct Abdomen Pelvis Without Oral With Iv Contrast    Result Date: 10/1/2019  EXAM: CT ABDOMEN PELVIS WO ORAL W IV CONTRAST LOCATION: [facility] DATE/TIME: 10/1/2019 6:39 PM INDICATION: Abd pain, possible hernia. Concerned about mass, firm suprapubic area. COMPARISON: None. TECHNIQUE: Helical enhanced thin-section CT scan of the abdomen and pelvis was performed following injection of IV contrast. Multiplanar reformats were obtained. Dose reduction techniques were used. CONTRAST: Iohexol (Omni) 100 mL. FINDINGS: LUNG BASES: Platelike atelectasis in the right base. Calcified granuloma in the right lower lobe. ABDOMEN: Liver, spleen, pancreas, adrenal glands, and the kidneys are negative. There is distention of the colon down into the pelvis to the level of the sigmoid where there is a long region of wall thickening. Moderate inflammatory changes within the pelvis surround the sigmoid and there are diverticula present and there may be a small amount of extraluminal air. Findings suggest acute sigmoid diverticulitis with localized  perforation and probable early interloop abscess formation measuring 1.6 cm between the anterior lower sigmoid and a loop of sigmoid colon in the pelvis (image 156). There is, however, some prominent shouldering in the wall thickening superiorly in the sigmoid and underlying mass lesion will need to be excluded. There is also some less advanced distention of small bowel. PELVIS: Inflammatory change which extends into the right lower abdomen. MUSCULOSKELETAL: Multilevel lumbar degenerative change.     CONCLUSION: 1.  Colonic obstruction at the level of the sigmoid where there is a long region of wall thickening which does demonstrate some shouldering superiorly and an underlying mass lesion such as carcinoma needs to be excluded. There is, however, large amount of surrounding inflammatory change and a few diverticula present and findings also suggest locally perforated sigmoid diverticulitis. Probable early interloop abscess measuring 1.6 cm with inflammatory reaction extending into the right lower abdomen. Multiple small bowel loops also dilated although to a lesser extent than the colon. NOTE: ABNORMAL REPORT THE DICTATION ABOVE DESCRIBES AN ABNORMALITY FOR WHICH FOLLOW-UP IS NEEDED.     Ct Abdomen Pelvis With Oral With Iv Contrast    Result Date: 10/7/2019  EXAM: CT ABDOMEN PELVIS W ORAL W IV CONTRAST LOCATION: [facility] DATE/TIME: 10/7/2019 6:41 PM INDICATION: Abdominal pain, fever, post-op Abdominal infection suspected fever, ? abscess COMPARISON: 10/01/2019 TECHNIQUE: Helical enhanced thin-section CT scan of the abdomen and pelvis was performed following injection of IV contrast. Multiplanar reformats were obtained. Dose reduction techniques were used. CONTRAST: Iohexol (Omni) 100 mL FINDINGS: LUNG BASES: Mild bandlike atelectasis at the lung bases. Prior granulomatous process. ABDOMEN: Marked dilation of proximal small bowel loops beginning at the duodenal level and extending into the pelvis. Distal  small bowel loops are collapsed as they course through the lower pelvis. Numerous lucencies throughout the liver suspicious for portal venous gas. Tiny gallstone at the fundus. Small hepatic cyst. Pancreas and spleen appear normal. Adrenal glands and kidneys appear normal. Abdominal aorta normal in caliber. No free air or free fluid. PELVIS: Interval sigmoid resection and left lower quadrant colostomy. The distal colon is collapsed. Mid to distal small bowel loops are markedly dilated and fluid-filled. The terminal ileum is collapsed. The appendix is normal. Just anterior and inferior to the rectal pouch there is an enhancing fluid collection measuring 4.4 x 4.0 cm on axial image 370 by approximately 3 cm long on coronal image 125. A single locule of nondependent gas is present. Urinary bladder unremarkable. There are shoddy pelvic lymph nodes. Moderate inflammatory change in the pelvis. MUSCULOSKELETAL: There is mild subcutaneous edema along the lower anterior abdominal wall. Laparotomy staples noted. No suspicious bone lesion. Mild lumbar scoliosis and degenerative change.     CONCLUSION: 1.  Interval sigmoid resection. 2.  New distal small bowel obstruction likely related to ongoing inflammation given the proximity collapsed distal small bowel to pelvic inflammatory change and suspected abscess formation. 3.  New small pelvic abscess suggested measuring 3 x 4 x 4 cm just anterior and inferior to rectal pouch. 4.  Extensive portal venous gas, a nonspecific finding which can be seen in the setting of marked bowel distention, bowel ischemia, diverticulitis. Results were discussed with Dr. Palma.           Advanced Care Planning  Discharge Planning : Anticipate discharge in several days  Factors affecting discharge: Abscess treatment  Discussed care with patient, surgery for total time 35 minutes with greater than 50% of total time spent in counseling and coordination of care.    Rickey Palma MD  [facility]  [facility]    Office: [phone]

## 2021-06-02 NOTE — PROGRESS NOTES
Progress Note    Assessment/Plan    Principal Problem:    Diverticulitis  Active Problems:    Colon obstruction (H)    Cigarette nicotine dependence without complication    Acute on chronic sigmoid diverticulitis with perforation, colon obstruction status post sigmoid colectomy and colostomy postoperative day 4-pathology shows no malignancy.  NG tube placed on 10/4 due to ileus with nausea and vomiting, feeling much better.  Colostomy putting out stool, no melena or hematochezia.  Continuing NG tube today, possible discharge tomorrow.  Continue Zosyn.  Leukocytosis resolved.    Acute blood loss anemia-mild.  Recheck.    Tobacco abuse-counseled quitting.  Patch as needed.    DVT Prophylaxis   Lovenox    Subjective  Patient doing better.  No chest pain or shortness of breath, no nausea or vomiting.  NG tube causing throat irritation.  Having output from his colostomy.  Voiding.   Past Medical History  History reviewed. No pertinent past medical history.    Current Medications  Scheduled Meds:    acetaminophen  1,000 mg Oral TID     enoxaparin (LOVENOX) injection  40 mg Subcutaneous Q24H     influenza vaccine (age 50-64 YR or egg allergy) FLUBLOK quad (PF) inj 2019-20  0.5 mL Intramuscular Prior to Discharge     miconazole   Topical BID     pantoprazole  40 mg Intravenous Q24H     piperacillin-tazobactam  3.375 g Intravenous Q8H     sodium chloride  3 mL Intravenous Line Care     Continuous Infusions:    dextrose 5 % and sodium chloride 0.45 % with KCl 20 mEq/L 75 mL/hr (10/05/19 1436)     PRN Meds:.acetaminophen, aluminum-magnesium hydroxide-simethicone, benzocaine-menthol, calcium (as carbonate), HYDROmorphone, ketorolac, naloxone **OR** naloxone, nicotine, ondansetron **OR** ondansetron, oxyCODONE, phenol-phenolate sodium, prochlorperazine, sodium chloride bacteriostatic  No Known Allergies    Objective  Vital signs in last 24 hours  Temp:  [97.8  F (36.6  C)-100  F (37.8  C)] 98  F (36.7  C)  Heart Rate:  [100-113]  "100  Resp:  [16-20] 16  BP: (139-151)/(78-89) 151/89  Weight:   217 lb (98.4 kg)      Wt Readings from Last 2 Encounters:   10/04/19 1523 217 lb (98.4 kg)   10/03/19 1954 220 lb 1.6 oz (99.8 kg)   10/02/19 1546 218 lb 14.4 oz (99.3 kg)   10/01/19 2118 216 lb 9.6 oz (98.2 kg)   10/01/19 1354 (!) 250 lb (113.4 kg)   Weight change: -3 lb 1.6 oz (-1.406 kg)    Intake/Output last 3 shifts  I/O last 3 completed shifts:  In: 2067.5 [P.O.:480; I.V.:1487.5; IV Piggyback:100]  Out: 6260 [Urine:1375; Emesis/NG output:3675; Drains:10; Stool:1200]  Intake/Output this shift:  No intake/output data recorded.    Review of Systems   A 12 point comprehensive review of systems was negative except as noted.    Physical Exam    Temp:  [97.8  F (36.6  C)-100  F (37.8  C)] 98  F (36.7  C)  Heart Rate:  [100-113] 100  Resp:  [16-20] 16  BP: (139-151)/(78-89) 151/89    /89   Pulse 100   Temp 98  F (36.7  C) (Oral)   Resp 16   Ht 5' 10\" (1.778 m)   Wt 217 lb (98.4 kg)   SpO2 90%   BMI 31.14 kg/m    General appearance: alert, appears stated age and cooperative  Head: Normocephalic, without obvious abnormality, atraumatic  Eyes: Clear conjuctiva  Neck: no adenopathy, no carotid bruit, no JVD, supple, symmetrical, trachea midline and thyroid not enlarged, symmetric, no tenderness/mass/nodules  Lungs: clear to auscultation bilaterally  Heart: Hypoactive bowel sounds, midline stapled incision, colostomy intact with no significant output  Abdomen: Regular rate and rhythm  Extremities: Homans sign is negative, no sign of DVT  Skin: Skin color, texture, turgor normal. No rashes or lesions  Neurologic: Mental status: Alert, oriented, thought content appropriate  Cranial nerves: normal  Sensory: normal  Motor:grossly normal      Pertinent Labs   Lab Results: personally reviewed.   Labs from the previous 24 hours  Recent Results (from the past 24 hour(s))   Basic Metabolic Panel   Result Value Ref Range    Sodium 141 136 - 145 mmol/L    " Potassium 4.5 3.5 - 5.0 mmol/L    Chloride 106 98 - 107 mmol/L    CO2 29 22 - 31 mmol/L    Anion Gap, Calculation 6 5 - 18 mmol/L    Glucose 120 70 - 125 mg/dL    Calcium 7.9 (L) 8.5 - 10.5 mg/dL    BUN 26 (H) 8 - 22 mg/dL    Creatinine 0.93 0.70 - 1.30 mg/dL    GFR MDRD Af Amer >60 >60 mL/min/1.73m2    GFR MDRD Non Af Amer >60 >60 mL/min/1.73m2   HM2(CBC w/o Differential)   Result Value Ref Range    WBC 7.9 4.0 - 11.0 thou/uL    RBC 3.85 (L) 4.40 - 6.20 mill/uL    Hemoglobin 10.8 (L) 14.0 - 18.0 g/dL    Hematocrit 34.2 (L) 40.0 - 54.0 %    MCV 89 80 - 100 fL    MCH 28.1 27.0 - 34.0 pg    MCHC 31.6 (L) 32.0 - 36.0 g/dL    RDW 14.9 (H) 11.0 - 14.5 %    Platelets 514 (H) 140 - 440 thou/uL    MPV 8.0 (L) 8.5 - 12.5 fL     Labs from the previous 7 days:   Results from last 7 days   Lab Units 10/05/19  0631 10/04/19  0614 10/02/19  0357   LN-WHITE BLOOD CELL COUNT thou/uL 7.9 6.0 17.8*   LN-HEMOGLOBIN g/dL 10.8* 11.8* 13.7*   LN-PLATELET COUNT thou/uL 514* 559*  559* 579*   LN-NEUTROPHILS RELATIVE PERCENT %  --   --  91*   LN-MONOCYTES RELATIVE PERCENT %  --   --  4     Results from last 7 days   Lab Units 10/05/19  0631 10/04/19  0734 10/02/19  0357 10/01/19  1705   LN-SODIUM mmol/L 141 139 136 136   LN-POTASSIUM mmol/L 4.5 4.8 4.6 4.2   LN-CHLORIDE mmol/L 106 102 103 101   LN-CO2 mmol/L 29 28 26 27   LN-BLOOD UREA NITROGEN mg/dL 26* 27* 21 18   LN-CREATININE mg/dL 0.93 0.94 0.88 0.87   LN-CALCIUM mg/dL 7.9* 7.9* 8.5 9.1         Results from last 7 days   Lab Units 10/01/19  1705   LN-ALBUMIN g/dL 3.2*   LN-PROTEIN TOTAL g/dL 7.2   LN-BILIRUBIN TOTAL mg/dL 0.5   LN-ALKALINE PHOSPHATASE U/L 71   LN-ALT (SGPT) U/L 16   LN-AST (SGOT) U/L 14           Other labs:   No results found for: INR, PROTIME    Pertinent Radiology   Personally reviewed radiologists report and impressions  Xr Abdomen Ap Portable    Result Date: 10/4/2019  EXAM: XR ABDOMEN AP PORTABLE LOCATION: [facility] DATE/TIME: 10/4/2019 12:31 PM  INDICATION: NG tube placement. COMPARISON: 10/4/2019 11:33 AM abdominal radiograph and CT 10/1/2019.     Nasogastric suction tube has been repositioned with tip now in the mid stomach in good position. Partially visualized persistent dilatation of loops of small and large bowel. Mild gaseous distention of the stomach. Mild platelike atelectasis right lower lung again seen.        Xr Abdomen Ap Portable    Result Date: 10/4/2019  EXAM: XR ABDOMEN AP PORTABLE LOCATION: [facility] DATE/TIME: 10/4/2019 11:41 AM INDICATION: NG tube placement verification COMPARISON: None.     The distal aspect of the nasogastric tube is seen within the midline chest at the level of the aashish. It is difficult to tell with certainty whether or not this lies within the mid esophagus rather than the airway. Gaseous distention of the stomach and visualized portions of the small bowel are noted within the limited portions of the upper abdomen.    Ct Abdomen Pelvis Without Oral With Iv Contrast    Result Date: 10/1/2019  EXAM: CT ABDOMEN PELVIS WO ORAL W IV CONTRAST LOCATION: [facility] DATE/TIME: 10/1/2019 6:39 PM INDICATION: Abd pain, possible hernia. Concerned about mass, firm suprapubic area. COMPARISON: None. TECHNIQUE: Helical enhanced thin-section CT scan of the abdomen and pelvis was performed following injection of IV contrast. Multiplanar reformats were obtained. Dose reduction techniques were used. CONTRAST: Iohexol (Omni) 100 mL. FINDINGS: LUNG BASES: Platelike atelectasis in the right base. Calcified granuloma in the right lower lobe. ABDOMEN: Liver, spleen, pancreas, adrenal glands, and the kidneys are negative. There is distention of the colon down into the pelvis to the level of the sigmoid where there is a long region of wall thickening. Moderate inflammatory changes within the pelvis surround the sigmoid and there are diverticula present and there may be a small amount of extraluminal air. Findings suggest  acute sigmoid diverticulitis with localized perforation and probable early interloop abscess formation measuring 1.6 cm between the anterior lower sigmoid and a loop of sigmoid colon in the pelvis (image 156). There is, however, some prominent shouldering in the wall thickening superiorly in the sigmoid and underlying mass lesion will need to be excluded. There is also some less advanced distention of small bowel. PELVIS: Inflammatory change which extends into the right lower abdomen. MUSCULOSKELETAL: Multilevel lumbar degenerative change.     CONCLUSION: 1.  Colonic obstruction at the level of the sigmoid where there is a long region of wall thickening which does demonstrate some shouldering superiorly and an underlying mass lesion such as carcinoma needs to be excluded. There is, however, large amount of surrounding inflammatory change and a few diverticula present and findings also suggest locally perforated sigmoid diverticulitis. Probable early interloop abscess measuring 1.6 cm with inflammatory reaction extending into the right lower abdomen. Multiple small bowel loops also dilated although to a lesser extent than the colon. NOTE: ABNORMAL REPORT THE DICTATION ABOVE DESCRIBES AN ABNORMALITY FOR WHICH FOLLOW-UP IS NEEDED.           Advanced Care Planning  Discharge Planning : Anticipate discharge in several days  Factors affecting discharge: Option of bowel  Discussed care with patient, surgery for total time 35 minutes with greater than 50% of total time spent in counseling and coordination of care.    Rickey Palma MD  [facility] Hospitalist    Office: [phone]

## 2021-06-02 NOTE — PLAN OF CARE
Pt rates surgical pain #3.  Throat pain from NG #7.  Dilaudid given .  Pt voiding.  Colostomy output. NG output large but taking in clear liquids often and ice.  IV fluid bolus given . Dressing changed to GEORGINA for small drainage from site leaking. Incision covered with abd. Strong productive cough. IV fluids.  IV Antibxs. Care plan reviewed.   Problem: Pain  Goal: Patient's pain/discomfort is manageable  Outcome: Progressing     Problem: Safety  Goal: Patient will be injury free during hospitalization  Outcome: Progressing     Problem: Daily Care  Goal: Daily care needs are met  Outcome: Progressing     Problem: Psychosocial Needs  Goal: Demonstrates ability to cope with hospitalization/illness  Outcome: Progressing  Goal: Collaborate with patient/family/caregiver to identify patient specific goals for this hospitalization  Outcome: Progressing     Problem: Discharge Barriers  Goal: Patient's discharge needs are met  Outcome: Progressing     Problem: Knowledge Deficit  Goal: Patient/family/caregiver demonstrates understanding of disease process, treatment plan, medications, and discharge instructions  Outcome: Progressing     Problem: Potential for Falls  Goal: Patient will remain free of falls  Outcome: Progressing     Problem: Potential for Compromised Skin Integrity  Goal: Skin integrity is maintained or improved  Outcome: Progressing  Goal: Nutritional status is improving  Outcome: Progressing     Problem: Urinary Incontinence  Goal: Perineal skin integrity is maintained or improved  Outcome: Progressing     Problem: Risk for Fluid Volume Deficit  Goal: Deficient fluid volume related to excessive extracellular fluid losses or decreased fluid intake  Outcome: Progressing     Problem: Fluid volume excess  Goal: Excess fluid volume related to increased water and/or sodium retention  Outcome: Progressing

## 2021-06-03 VITALS
HEIGHT: 70 IN | WEIGHT: 217 LBS | SYSTOLIC BLOOD PRESSURE: 112 MMHG | HEART RATE: 70 BPM | RESPIRATION RATE: 14 BRPM | DIASTOLIC BLOOD PRESSURE: 68 MMHG | BODY MASS INDEX: 31.07 KG/M2 | OXYGEN SATURATION: 94 %

## 2021-06-03 VITALS
HEIGHT: 70 IN | BODY MASS INDEX: 30.79 KG/M2 | WEIGHT: 215.1 LBS | BODY MASS INDEX: 30.79 KG/M2 | HEIGHT: 70 IN | WEIGHT: 215.1 LBS

## 2021-06-03 VITALS
HEART RATE: 92 BPM | SYSTOLIC BLOOD PRESSURE: 126 MMHG | HEIGHT: 70 IN | WEIGHT: 218.3 LBS | OXYGEN SATURATION: 98 % | RESPIRATION RATE: 16 BRPM | DIASTOLIC BLOOD PRESSURE: 68 MMHG | BODY MASS INDEX: 31.25 KG/M2

## 2021-06-04 VITALS
HEART RATE: 90 BPM | BODY MASS INDEX: 31.98 KG/M2 | OXYGEN SATURATION: 98 % | RESPIRATION RATE: 14 BRPM | SYSTOLIC BLOOD PRESSURE: 102 MMHG | DIASTOLIC BLOOD PRESSURE: 68 MMHG | TEMPERATURE: 97.3 F | WEIGHT: 222.9 LBS

## 2021-06-04 VITALS — HEIGHT: 70 IN | BODY MASS INDEX: 31.52 KG/M2 | WEIGHT: 220.2 LBS

## 2021-06-04 VITALS — HEIGHT: 70 IN | WEIGHT: 217 LBS | BODY MASS INDEX: 31.07 KG/M2

## 2021-06-05 ENCOUNTER — HEALTH MAINTENANCE LETTER (OUTPATIENT)
Age: 56
End: 2021-06-05

## 2021-06-05 NOTE — ANESTHESIA PREPROCEDURE EVALUATION
Anesthesia Evaluation      Patient summary reviewed   No history of anesthetic complications     Airway   Mallampati: II  Neck ROM: full   Pulmonary - normal exam    breath sounds clear to auscultation  (+) a smoker (former 30 pack years)  (-) COPD, asthma, shortness of breath, recent URI                         Cardiovascular - negative ROS and normal exam  Exercise tolerance: > or = 4 METS  (-) murmur  ECG reviewed (10/11/19: NSR w/ sinus arrhythmia, 97 bpm)  Rhythm: regular  Rate: normal,    no murmur      Neuro/Psych    (+) neuromuscular disease (meralgia paresthetica),      Endo/Other    (+) obesity (BMI 33),      Comments: Hx of DVTs    GI/Hepatic/Renal - negative ROS      Other findings: Labs 1/20/20:  Na 141, K 4.4, BUN 16, Cr 0.85        Dental    (+) bridge                       Anesthesia Plan  Planned anesthetic: general endotracheal  GETA.  Decadron and zofran for PONV ppx.  Ketamine 50 mg w/ induction for opioid sparing.  Ketorolac 30 mg at EOC.  TAP block for POP per surgeon request.  ASA 2   Induction: intravenous   Anesthetic plan and risks discussed with: spouse and patient  Anesthesia plan special considerations: antiemetics,   Post-op plan: routine recovery

## 2021-06-05 NOTE — PROGRESS NOTES
Kwaku comes in for follow-up of his sigmoid colectomy with diverting colostomy.  He is overall feeling well.  He feels like he is made a full recovery now.  He would like to discuss colostomy takedown.  He has never had a colonoscopy in the past.    Physical exam:  Looks well.  Abdomen is soft and nontender.  His incision looks good.  Bag is in place in the left lower quadrant.    Impression: Full recovery following sigmoid colectomy for ruptured diverticulitis.  He is doing well.  I think we can schedule him for colostomy takedown.  I discussed the procedure with him.  We will attempt to do it with laparoscopic assistance.  His rectal stump however is quite low so this may require some further dissection to get that exposed.  He definitely needs a colonoscopy prior to this to be sure he has no other underlying pathology.    Plan: We will get him set up for colonoscopy with 1 of my partners try to coordinate it so that his surgery can be done within a couple days after that.  He asked appropriate questions.  We will get him set up at his convenience.

## 2021-06-05 NOTE — TELEPHONE ENCOUNTER
Called pt to discuss his questions. He will continue with stool softeners for another day and if he has no success, will try Miralax daily to two times a day. Also encouraged his fluid intake. He will follow up with any other questions or concerns.

## 2021-06-05 NOTE — ANESTHESIA PROCEDURE NOTES
Peripheral Block    Patient location during procedure: OR  Start time: 1/27/2020 10:58 AM  End time: 1/27/2020 11:03 AM  post-op analgesia per surgeon order as noted in medical record  Staffing:  Performing  Anesthesiologist: Fei Rosa MD  Preanesthetic Checklist  Completed: patient identified, site marked, risks, benefits, and alternatives discussed, timeout performed, consent obtained, airway assessed, oxygen available, suction available, emergency drugs available and hand hygiene performed  Peripheral Block  Block type: other, TAP  Prep: ChloraPrep  Patient position: supine  Patient monitoring: continuous pulse oximetry, heart rate, blood pressure and cardiac monitor  Laterality: bilateral, same technique used bilaterally  Injection technique: ultrasound guided    Ultrasound used to visualize needle placement in proximity to nerve being blocked: yes   US used to visualize anesthetic spread  Visualized anatomic structures normal  No Pathological Findings  Permanent ultrasound image captured for medical record  Sterile gel and probe cover used for ultrasound.  Needle  Needle type: Stimuplex   Needle gauge: 20G  Needle length: 4 in  no peripheral nerve catheter placed  Assessment  Injection assessment: no difficulty with injection and incremental injection  Additional Notes  Heme aspiration on the left side prior to exparel injection, redirected needle and negative aspiration for heme.

## 2021-06-05 NOTE — ANESTHESIA POSTPROCEDURE EVALUATION
Patient: Kwaku Todd  Procedure(s):  OPEN COLOSTOMY TAKEDOWN  Anesthesia type: general    Patient location: PACU  Last vitals:   Vitals Value Taken Time   /77 1/27/2020 11:40 AM   Temp    1/27/2020 11:45 AM   Pulse 76 1/27/2020 11:43 AM   Resp 10 1/27/2020 11:43 AM   SpO2 99 % 1/27/2020 11:43 AM   Vitals shown include unvalidated device data.  Post vital signs: stable  Level of consciousness: awake and responds to simple questions  Post-anesthesia pain: pain controlled  Post-anesthesia nausea and vomiting: no  Pulmonary: unassisted, return to baseline  Cardiovascular: stable and blood pressure at baseline  Hydration: adequate  Anesthetic events: no    QCDR Measures:  ASA# 11 - Danielle-op Cardiac Arrest: ASA11B - Patient did NOT experience unanticipated cardiac arrest  ASA# 12 - Danielle-op Mortality Rate: ASA12B - Patient did NOT die  ASA# 13 - PACU Re-Intubation Rate: ASA13B - Patient did NOT require a new airway mgmt  ASA# 10 - Composite Anes Safety: ASA10A - No serious adverse event    Additional Notes:

## 2021-06-05 NOTE — ANESTHESIA CARE TRANSFER NOTE
Last vitals:   Vitals:    01/27/20 1110   BP: (P) 139/85   Pulse: (P) 85   Resp: (P) 12   Temp: (P) 36.2  C (97.2  F)   SpO2: (P) 100%     Patient's level of consciousness is drowsy  Spontaneous respirations: yes  Maintains airway independently: yes  Dentition unchanged: yes  Oropharynx: oropharynx clear of all foreign objects    QCDR Measures:  ASA# 20 - Surgical Safety Checklist: WHO surgical safety checklist completed prior to induction    PQRS# 430 - Adult PONV Prevention: 4558F - Pt received => 2 anti-emetic agents (different classes) preop & intraop  ASA# 8 - Peds PONV Prevention: NA - Not pediatric patient, not GA or 2 or more risk factors NOT present  PQRS# 424 - Danielle-op Temp Management: 4559F - At least one body temp DOCUMENTED => 35.5C or 95.9F within required timeframe  PQRS# 426 - PACU Transfer Protocol: - Transfer of care checklist used  ASA# 14 - Acute Post-op Pain: ASA14B - Patient did NOT experience pain >= 7 out of 10

## 2021-06-05 NOTE — ANESTHESIA CARE TRANSFER NOTE
Last vitals:   Vitals:    01/24/20 1356   BP: 100/55   Pulse: 95   Resp: 20   Temp: 36.6  C (97.8  F)   SpO2: 91%     Pt brought to phase 2 on room air. Monitors applied. VSS.    Patient's level of consciousness is drowsy  Spontaneous respirations: yes  Maintains airway independently: yes  Dentition unchanged: yes  Oropharynx: oropharynx clear of all foreign objects    QCDR Measures:  ASA# 20 - Surgical Safety Checklist: WHO surgical safety checklist completed prior to induction    PQRS# 430 - Adult PONV Prevention: NA - Not adult patient, not GA or 3 or more risk factors NOT present  ASA# 8 - Peds PONV Prevention: NA - Not pediatric patient, not GA or 2 or more risk factors NOT present  PQRS# 424 - Danielle-op Temp Management: 4559F - At least one body temp DOCUMENTED => 35.5C or 95.9F within required timeframe  PQRS# 426 - PACU Transfer Protocol: - Transfer of care checklist used  ASA# 14 - Acute Post-op Pain: ASA14B - Patient did NOT experience pain >= 7 out of 10

## 2021-06-06 NOTE — TELEPHONE ENCOUNTER
Pts Care Provider: Dr. Fong    Caller: Kwaku    Phone Number: [phone]  OK to leave message: Yes    Reason for Call: Pt called to request for a return to work letter to be sent to his [employer] [employer].   If there are any questions please call the patient at [phone]

## 2021-06-06 NOTE — PROGRESS NOTES
Kwaku is in for follow-up of a Colostomy closure.    He is now just a little more than 2 weeks out from surgery.  Pain is very well controlled. No fevers. Eating well.  Having normal bowel movements, 2-3 a day.    Physical exam:  General: He is moving around well with no signs of discomfort.  Abdomen: Soft minimal tenderness.  The incision(s) is healing up nicely with no signs of infection.    Pathology: Benign anastomotic rings.  A polyp removed by Dr. Figueroa during a preop colonoscopy was just a benign adenoma.    Impression: Doing well postoperatively.  No signs of complications.  Actually did remarkably well.  Plan: Encouraged  to slowly get back to normal activities.  Told that it would be at least another 2-4 weeks before getting back to normal.  Follow-up with me will be as needed.

## 2021-06-16 PROBLEM — K57.92 DIVERTICULITIS: Status: ACTIVE | Noted: 2019-10-01

## 2021-06-16 PROBLEM — F17.210 CIGARETTE NICOTINE DEPENDENCE WITHOUT COMPLICATION: Status: ACTIVE | Noted: 2019-10-01

## 2021-06-16 PROBLEM — Z93.3: Status: ACTIVE | Noted: 2020-01-27

## 2021-06-16 PROBLEM — Z93.9 HISTORY OF CREATION OF OSTOMY (H): Status: ACTIVE | Noted: 2020-01-14

## 2021-06-16 PROBLEM — Z12.11 ENCOUNTER FOR SCREENING COLONOSCOPY: Status: ACTIVE | Noted: 2020-01-14

## 2021-06-19 NOTE — LETTER
Letter by Bridgett Dixon RN at      Author: Bridgett Dixon RN Service: -- Author Type: --    Filed:  Encounter Date: 11/6/2019 Status: Signed         November 6, 2019     Patient: Kwaku Todd   YOB: 1965   Date of Visit: 11/6/2019       To Whom It May Concern:    It is my medical opinion that Kwaku Todd may return to work on 11/4/19 with no restrictions.    If you have any questions or concerns, please don't hesitate to call.    Sincerely,        Electronically signed by Tahira Fong MD

## 2021-06-20 NOTE — LETTER
Letter by Bridgett Dixon RN at      Author: Bridgett Dixon RN Service: -- Author Type: --    Filed:  Encounter Date: 3/10/2020 Status: (Other)         March 10, 2020     Patient: Kwaku Todd   YOB: 1965   Date of Visit: 3/10/2020       To Whom It May Concern:    It is my medical opinion that Kwaku Todd may return to work on 3/10/2020 with no restrictions.    If you have any questions or concerns, please don't hesitate to call.    Sincerely,        Electronically signed by Tahira Fong MD

## 2021-07-03 NOTE — ANESTHESIA PREPROCEDURE EVALUATION
Anesthesia Preprocedure Evaluation by Fei Lawrence MD at 10/1/2019 10:05 PM     Author: Fei Lawrence MD Service: -- Author Type: Physician    Filed: 10/1/2019 10:05 PM Date of Service: 10/1/2019 10:05 PM Status: Addendum    : Fei Lawrence MD (Physician)    Related Notes: Original Note by Fei Lawrence MD (Physician) filed at 10/1/2019  9:31 PM       Anesthesia Evaluation      Patient summary reviewed   No history of anesthetic complications     Airway   Mallampati: II  Neck ROM: full   Pulmonary - normal exam    breath sounds clear to auscultation  (+) a smoker                         Cardiovascular - normal exam  Exercise tolerance: > or = 4 METS  Rhythm: regular  Rate: normal,         Neuro/Psych      Endo/Other    (+) obesity,      GI/Hepatic/Renal            Dental    (+) poor dentition                           Anesthesia Plan  Planned anesthetic: general endotracheal    ASA 2 - emergent   Induction: intravenous   Anesthetic plan and risks discussed with: patient  Anesthesia plan special considerations: antiemetics,   Post-op plan: routine recovery

## 2021-07-03 NOTE — ANESTHESIA PREPROCEDURE EVALUATION
Anesthesia Preprocedure Evaluation by Rickey Jackson MD at 1/24/2020 12:49 PM     Author: Rickey Jackson MD Service: -- Author Type: Physician    Filed: 1/24/2020 12:52 PM Date of Service: 1/24/2020 12:49 PM Status: Addendum    : Rickey Jackson MD (Physician)    Related Notes: Original Note by Rickey Jackson MD (Physician) filed at 1/24/2020 12:50 PM       Anesthesia Evaluation      Patient summary reviewed   No history of anesthetic complications     Airway   Mallampati: II  Neck ROM: full   Pulmonary - normal exam    breath sounds clear to auscultation  (+) a smoker                         Cardiovascular - normal exam  Exercise tolerance: > or = 4 METS  Rhythm: regular  Rate: normal,         Neuro/Psych      Endo/Other    (+) obesity,      GI/Hepatic/Renal      Comments: diverticulitis     Other findings: Hgb=14  K=4.4  GFR>60      Dental    (+) poor dentition                             Anesthesia Plan  Planned anesthetic: MAC  Propofol infusion  ASA 2 - emergent     Anesthetic plan and risks discussed with: patient  Anesthesia plan special considerations: antiemetics,   Post-op plan: routine recovery

## 2021-09-25 ENCOUNTER — HEALTH MAINTENANCE LETTER (OUTPATIENT)
Age: 56
End: 2021-09-25

## 2022-06-26 ENCOUNTER — HEALTH MAINTENANCE LETTER (OUTPATIENT)
Age: 57
End: 2022-06-26

## 2022-12-26 ENCOUNTER — HEALTH MAINTENANCE LETTER (OUTPATIENT)
Age: 57
End: 2022-12-26

## 2023-07-09 ENCOUNTER — HEALTH MAINTENANCE LETTER (OUTPATIENT)
Age: 58
End: 2023-07-09

## 2025-01-31 ENCOUNTER — LAB REQUISITION (OUTPATIENT)
Dept: LAB | Facility: CLINIC | Age: 60
End: 2025-01-31

## 2025-01-31 DIAGNOSIS — R60.0 LOCALIZED EDEMA: ICD-10-CM

## 2025-01-31 PROCEDURE — 82040 ASSAY OF SERUM ALBUMIN: CPT | Performed by: FAMILY MEDICINE

## 2025-01-31 PROCEDURE — 83880 ASSAY OF NATRIURETIC PEPTIDE: CPT | Performed by: FAMILY MEDICINE

## 2025-02-01 LAB
ALBUMIN SERPL BCG-MCNC: 3.8 G/DL (ref 3.5–5.2)
ALP SERPL-CCNC: 223 U/L (ref 40–150)
ALT SERPL W P-5'-P-CCNC: 79 U/L (ref 0–70)
ANION GAP SERPL CALCULATED.3IONS-SCNC: 16 MMOL/L (ref 7–15)
AST SERPL W P-5'-P-CCNC: 153 U/L (ref 0–45)
BILIRUB SERPL-MCNC: 1 MG/DL
BUN SERPL-MCNC: 12.6 MG/DL (ref 8–23)
CALCIUM SERPL-MCNC: 8.5 MG/DL (ref 8.8–10.4)
CHLORIDE SERPL-SCNC: 101 MMOL/L (ref 98–107)
CREAT SERPL-MCNC: 0.86 MG/DL (ref 0.67–1.17)
EGFRCR SERPLBLD CKD-EPI 2021: >90 ML/MIN/1.73M2
GLUCOSE SERPL-MCNC: 149 MG/DL (ref 70–99)
HCO3 SERPL-SCNC: 23 MMOL/L (ref 22–29)
NT-PROBNP SERPL-MCNC: 54 PG/ML (ref 0–900)
POTASSIUM SERPL-SCNC: 3.3 MMOL/L (ref 3.4–5.3)
PROT SERPL-MCNC: 6.8 G/DL (ref 6.4–8.3)
SODIUM SERPL-SCNC: 140 MMOL/L (ref 135–145)

## 2025-02-18 ENCOUNTER — LAB REQUISITION (OUTPATIENT)
Dept: LAB | Facility: CLINIC | Age: 60
End: 2025-02-18

## 2025-02-18 DIAGNOSIS — Z13.220 ENCOUNTER FOR SCREENING FOR LIPOID DISORDERS: ICD-10-CM

## 2025-02-18 DIAGNOSIS — R89.9 UNSPECIFIED ABNORMAL FINDING IN SPECIMENS FROM OTHER ORGANS, SYSTEMS AND TISSUES: ICD-10-CM

## 2025-02-18 DIAGNOSIS — Z12.5 ENCOUNTER FOR SCREENING FOR MALIGNANT NEOPLASM OF PROSTATE: ICD-10-CM

## 2025-02-18 LAB
ALBUMIN SERPL BCG-MCNC: 3.9 G/DL (ref 3.5–5.2)
ALP SERPL-CCNC: 175 U/L (ref 40–150)
ALT SERPL W P-5'-P-CCNC: 78 U/L (ref 0–70)
ANION GAP SERPL CALCULATED.3IONS-SCNC: 17 MMOL/L (ref 7–15)
AST SERPL W P-5'-P-CCNC: 192 U/L (ref 0–45)
BILIRUB SERPL-MCNC: 1.1 MG/DL
BUN SERPL-MCNC: 12.3 MG/DL (ref 8–23)
CALCIUM SERPL-MCNC: 8 MG/DL (ref 8.8–10.4)
CHLORIDE SERPL-SCNC: 101 MMOL/L (ref 98–107)
CHOLEST SERPL-MCNC: 198 MG/DL
CREAT SERPL-MCNC: 1 MG/DL (ref 0.67–1.17)
EGFRCR SERPLBLD CKD-EPI 2021: 86 ML/MIN/1.73M2
FASTING STATUS PATIENT QL REPORTED: NO
FASTING STATUS PATIENT QL REPORTED: NO
GLUCOSE SERPL-MCNC: 149 MG/DL (ref 70–99)
HCO3 SERPL-SCNC: 26 MMOL/L (ref 22–29)
HDLC SERPL-MCNC: 58 MG/DL
LDLC SERPL CALC-MCNC: 116 MG/DL
NONHDLC SERPL-MCNC: 140 MG/DL
POTASSIUM SERPL-SCNC: 3.5 MMOL/L (ref 3.4–5.3)
PROT SERPL-MCNC: 7 G/DL (ref 6.4–8.3)
PSA SERPL DL<=0.01 NG/ML-MCNC: 0.41 NG/ML (ref 0–4.5)
SODIUM SERPL-SCNC: 144 MMOL/L (ref 135–145)
TRIGL SERPL-MCNC: 118 MG/DL

## 2025-02-18 PROCEDURE — 82465 ASSAY BLD/SERUM CHOLESTEROL: CPT | Performed by: FAMILY MEDICINE

## 2025-02-18 PROCEDURE — G0103 PSA SCREENING: HCPCS | Performed by: FAMILY MEDICINE

## 2025-02-18 PROCEDURE — 84295 ASSAY OF SERUM SODIUM: CPT | Performed by: FAMILY MEDICINE

## 2025-02-27 ENCOUNTER — MEDICAL CORRESPONDENCE (OUTPATIENT)
Dept: HEALTH INFORMATION MANAGEMENT | Facility: CLINIC | Age: 60
End: 2025-02-27